# Patient Record
Sex: FEMALE | Race: WHITE | Employment: FULL TIME | URBAN - METROPOLITAN AREA
[De-identification: names, ages, dates, MRNs, and addresses within clinical notes are randomized per-mention and may not be internally consistent; named-entity substitution may affect disease eponyms.]

---

## 2018-03-29 ENCOUNTER — OFFICE VISIT (OUTPATIENT)
Dept: OBGYN CLINIC | Age: 28
End: 2018-03-29

## 2018-03-29 ENCOUNTER — TELEPHONE (OUTPATIENT)
Dept: OBGYN CLINIC | Age: 28
End: 2018-03-29

## 2018-03-29 VITALS
WEIGHT: 176 LBS | HEART RATE: 102 BPM | SYSTOLIC BLOOD PRESSURE: 132 MMHG | DIASTOLIC BLOOD PRESSURE: 70 MMHG | HEIGHT: 59 IN | BODY MASS INDEX: 35.48 KG/M2 | RESPIRATION RATE: 18 BRPM

## 2018-03-29 DIAGNOSIS — Z32.00 ENCOUNTER FOR CONFIRMATION OF PREGNANCY TEST RESULT WITH PHYSICAL EXAMINATION: Primary | ICD-10-CM

## 2018-03-29 LAB
HCG URINE, QL. (POC): NEGATIVE
VALID INTERNAL CONTROL?: YES

## 2018-03-29 NOTE — PROGRESS NOTES
Confirmation of Pregnancy Visit    Florina Ball is a 28 y.o. G2 [id number] presenting for confirmation of pregnancy. She had her first positive UPT on yesterday. She notes new spotting today. Pregnancy symptoms:  -N/V? N  -breast tenderness? N  -fatigue? N  -cramping? N  -weight change? N  -Vaginal bleeding? N  -Fetal Movement? N    Ob/Gyn Hx:         Review of Systems - History obtained from the patient  Constitutional: negative for weight loss, fever, night sweats  HEENT: negative for hearing loss, earache, congestion, snoring, sorethroat  CV: negative for chest pain, palpitations, edema  Resp: negative for cough, shortness of breath, wheezing  GI: negative for change in bowel habits, abdominal pain, black or bloody stools  : negative for frequency, dysuria, hematuria, vaginal discharge  MSK: negative for back pain, joint pain, muscle pain  Breast: negative for breast lumps, nipple discharge, galactorrhea  Skin :negative for itching, rash, hives  Neuro: negative for dizziness, headache, confusion, weakness  Psych: negative for anxiety, depression, change in mood  Heme/lymph: negative for bleeding, bruising, pallor    Physical Exam    Visit Vitals    /70 (BP 1 Location: Left arm, BP Patient Position: Sitting)    Pulse (!) 102    Resp 18    Ht 4' 11\" (1.499 m)    Wt 176 lb (79.8 kg)    BMI 35.55 kg/m2       Constitutional  · Appearance: well-nourished, well developed, alert, in no acute distress    HENT  · Head and Face: appears normal    Neck  · Inspection/Palpation: normal appearance, no masses or tenderness  · Lymph Nodes: no lymphadenopathy present  · Thyroid: gland size normal, nontender, no nodules or masses present on palpation    Chest  · Respiratory Effort: non-labored breathing  · Auscultation: CTAB, normal breath sounds    Cardiovascular  · Heart:  · Auscultation: regular rate and rhythm without murmur  · Extremities: no peripheral edema    Gastrointestinal  · Abdominal Examination: abdomen non-tender to palpation, normal bowel sounds, no masses present  · Liver and spleen: no hepatomegaly present, spleen not palpable  · Hernias: no hernias identified    Skin  · General Inspection: no rash, no lesions identified    Neurologic/Psychiatric  · Mental Status:  · Orientation: grossly oriented to person, place and time  · Mood and Affect: mood normal, affect appropriate    UPT: NEGATIVE in office    Assessment/Plan:  28 y.o.      HCG quant sent today, will contact with results    RTC:  prn for problems or concerns  Cramping, pain, bleeding precautions reviewed  Handouts and instructions provided    Hugh Thayer MD  3/29/2018  2:13 PM

## 2018-03-29 NOTE — TELEPHONE ENCOUNTER
Patient calling stating that she found out she is pregnant 2 days ago and now experiencing vaginal bleeding and mild cramping. Patient reports that the bleeding started yesterday morning and is light in flow, bright red, no clots and cramping comes and goes. Approx. LMP 2/20/218 which makes her roughly 5w2d pregnant. Patient is not drinking much water, last episode of intercourse was 3 days ago and last BM was yesterday and no problems with constipation. Patient has had a miscarriage before. Patient offered appointment, patient declined. Patient asked me to send the message to Dr. Eliz Arroyo as patient is supposed to be going out of town fore the Easter holiday. Patient advised pelvic rest and to hydrate herself with a glass of cold cold water. Please advise.

## 2018-03-30 ENCOUNTER — TELEPHONE (OUTPATIENT)
Dept: OBGYN CLINIC | Age: 28
End: 2018-03-30

## 2018-03-30 LAB
ABO GROUP BLD: NORMAL
HCG INTACT+B SERPL-ACNC: 3 MIU/ML
RH BLD: POSITIVE

## 2018-03-30 NOTE — TELEPHONE ENCOUNTER
Voice message left for patient to have a repeat lab HCG QT again in one week, call the office if any questions.

## 2018-03-30 NOTE — PROGRESS NOTES
Notify pt of result, needs to repeat hcg next week. Early preg versus chemical preg. Notified via voice message .

## 2018-06-14 ENCOUNTER — OFFICE VISIT (OUTPATIENT)
Dept: OBGYN CLINIC | Age: 28
End: 2018-06-14

## 2018-06-14 VITALS — HEIGHT: 59 IN | WEIGHT: 171.6 LBS | BODY MASS INDEX: 34.6 KG/M2

## 2018-06-14 DIAGNOSIS — Z34.81 PRENATAL CARE, SUBSEQUENT PREGNANCY, FIRST TRIMESTER: Primary | ICD-10-CM

## 2018-06-14 LAB
ANTIBODY SCREEN, EXTERNAL: NEGATIVE
CHLAMYDIA, EXTERNAL: NEGATIVE
HBSAG, EXTERNAL: NEGATIVE
HCT, EXTERNAL: 36.9
HGB EVAL, EXTERNAL: NORMAL
HGB, EXTERNAL: 12.4
HIV, EXTERNAL: NORMAL
N. GONORRHEA, EXTERNAL: NEGATIVE
PAP SMEAR, EXTERNAL: NORMAL
PLATELET CNT,   EXTERNAL: 287
RUBELLA, EXTERNAL: NORMAL
T. PALLIDUM, EXTERNAL: NEGATIVE
VARICELLA, EXTERNAL: NORMAL

## 2018-06-14 NOTE — PROGRESS NOTES
Current pregnancy history:    Ivette Gonsalves is a  33 y.o. female [facility] No LMP recorded. Patient is pregnant. .  She presents for the evaluation of amenorrhea a positive pregnancy test.    LMP history:  Reports miscarriage 2018. Did not have a cycle between miscarriage and new pregnancy. Ultrasound data:  She had an  ultrasound done by the ultrasound tech today which revealed a viable quinn pregnancy with a gestational age of 5 weeks and 1 days giving an EDC of 19. TA ULTRASOUND PERFORMED  A SINGLE VIABLE 11W1D WITH ISATU OF 2019 IUP IS SEEN WITH NORMAL CARDIAC RHYTHM. GESTATIONAL AGE BASED ON TODAYS ULTRASOUND. A NORMAL YOLK SAC IS SEEN. RIGHT OVARY APPEARS WITHIN NORMAL LIMITS. LEFT OVARY APPEARS WITHIN NORMAL LIMITS. NO FREE FLUID IS SEEN IN THE CDS. Pregnancy symptoms:    Since her LMP she has experienced  daily nausea. She has been vomiting over the last few weeks. Declines antinausea medication at this time. Associated signs and symptoms which she denies: dysuria, discharge, vaginal bleeding. Relevant past pregnancy history:   She has the following pregnancy history: Her last pregnancy was  uncomplicated. She has no history of  delivery. Relevant past medical history:(relevant to this pregnancy): noncontributory. Pap/Occupational history:  Last pap smear: 2017 per patient Results: Normal    Hx of abnormal Pap, colpo neg        Substance history: negative for alcohol, tobacco and street drugs. Positive for nothing. Exposure history: There is/are no indoor cat/s in the home. The patient was instructed to not change the cat litter. She admits close contact with children on a regular basis. She has had chicken pox or the vaccine in the past.   Patient denies issues with domestic violence.      Genetic Screening/Teratology Counseling: (Includes patient, baby's father, or anyone in either family with:)  3.  Patient's age >/= 35 at EDC?--no  2. Thalassemia ([facility], [facility], [address], or  background): MCV<80?--no.     3.  Neural tube defect (meningomyelocele, spina bifida, anencephaly)?--no.   4.  Congenital heart defect?--no.  5.  Down syndrome?--no.   6.  Armando-Sachs ([place], [place])?--no.   7.  Canavan's Disease?--no.   8.  Familial Dysautonomia?--no.   9.  Sickle cell disease or trait ()? --no   The patient has not been tested for sickle trait  10. Hemophilia or other blood disorders?--no. 11.  Muscular dystrophy?--no. 12.  Cystic fibrosis?--no. 13.  Lepanto's Chorea?--no. 14.  Mental retardation/autism (if yes was person tested for Fragile X)?--no. 15.  Other inherited genetic or chromosomal disorder?--no. 12.  Maternal metabolic disorder (DM, PKU, etc)?--no. 17.  Patient or FOB with a child with a birth defect not listed above?--no.  17a. Patient or FOB with a birth defect themselves?--no. 18.  Recurrent pregnancy loss, or stillbirth?--no. 19.  Any medications since LMP other than prenatal vitamins (include vitamins, supplements, OTC meds, drugs, alcohol)?--no. 20.  Any other genetic/environmental exposure to discuss?--no. Infection History:  1. Lives with someone with TB or TB exposed?--no.   2.  Patient or partner has history of genital herpes?--no.  3.  Rash or viral illness since LMP?--no.    4.  History of STD (GC, CT, HPV, syphilis, HIV)? --no   5. Other: OTHER? Past Medical History:   Diagnosis Date    Abnormal Papanicolaou smear of cervix     ASCUS pos hpv. colpo satisfactory    Anemia     taking iron     History of miscarriage, currently pregnant      Past Surgical History:   Procedure Laterality Date    HX OTHER SURGICAL      wisdom teeth     HX OTHER SURGICAL      cyst removed from hand     Social History     Occupational History    Not on file.      Social History Main Topics    Smoking status: Never Smoker    Smokeless tobacco: Never Used    Alcohol use No    Drug use: No    Sexual activity: Yes     Partners: Male     Birth control/ protection: None     Family History   Problem Relation Age of Onset    Breast Cancer Maternal Grandmother      OB History    Para Term  AB Living   4 1 1 0 2 1   SAB TAB Ectopic Molar Multiple Live Births   2 0 0 0 0 1      # Outcome Date GA Lbr Franco/2nd Weight Sex Delivery Anes PTL Lv   4 Current            3 Term 16 40w5d  7 lb 4.8 oz (3.31 kg) F  LO SPINAL AN N RAYMOND      Complications: Failure to Progress in First Stage   2 SAB            1 SAB               Obstetric Comments   sab 2015, 2018     No Known Allergies  Prior to Admission medications    Medication Sig Start Date End Date Taking? Authorizing Provider   HYDROcodone-acetaminophen (NORCO) 5-325 mg per tablet Take 2 Tabs by mouth every six (6) hours as needed. Max Daily Amount: 8 Tabs. 16   Alicja Razo MD   ibuprofen (MOTRIN) 600 mg tablet Take 1 Tab by mouth every six (6) hours as needed for Pain. 16   Alicja Razo MD   PNV no.[id number] acid-dha -610 mg-mcg-mg cmpk Take  by mouth.     Historical Provider        Review of Systems: History obtained from the patient  Constitutional: negative for weight loss, fever, night sweats  HEENT: negative for hearing loss, earache, congestion, snoring, sore throat  CV: negative for chest pain, palpitations, edema  Resp: negative for cough, shortness of breath, wheezing  Breast: negative for breast lumps, nipple discharge, galactorrhea  GI: negative for change in bowel habits, abdominal pain, black or bloody stools  : negative for frequency, dysuria, hematuria, vaginal discharge  MSK: negative for back pain, joint pain, muscle pain  Skin: negative for itching, rash, hives  Neuro: negative for dizziness, headache, confusion, weakness  Psych: negative for anxiety, depression, change in mood  Heme/lymph: negative for bleeding, bruising, pallor    Objective:  Visit Vitals    Ht 4' 11\" (1.499 m)    Wt 171 lb 9.6 oz (77.8 kg)    BMI 34.66 kg/m2       Physical Exam:     Constitutional  · Appearance: well-nourished, well developed, alert, in no acute distress    HENT  · Head  · Face: appears normal  · Eyes: appear normal  · Ears: normal  · Mouth: normal  · Lips: no lesions      Chest  · Respiratory Effort: breathing unlabored     Cardiovascular  · Heart:  · Auscultation: regular rate and rhythm without murmur      Gastrointestinal  · Abdominal Examination: abdomen non-tender to palpation, normal bowel sounds, no masses present  · Liver and spleen: no hepatomegaly present, spleen not palpable  · Hernias: no hernias identified    Genitourinary  · EGBUS No REP, or discharge normal hair distribuation  · Vagina pink, no discharge, no lesions  · Cervix pink, closed  Skin  · General Inspection: no rash, no lesions identified    Neurologic/Psychiatric  · Mental Status:  · Orientation: grossly oriented to person, place and time  · Mood and Affect: mood normal, affect appropriate    Assessment:   Intrauterine pregnancy with issues addressed in problem list      Plan:     Offered CF testing, CVS, Nuchal Translucency, MSAFP, amnio, and discussed NIPT  Course of pregnancy discussed including visit schedule, routine U/S, glucola testing, etc.  Avoid alcoholic beverages and illicit/recreational drugs use  Take prenatal vitamins or folic acid daily. Hospital and practice style discussed with coverage system. Discussed nutrition, toxoplasmosis precautions, sexual activity, exercise, need for influenza vaccine, environmental and work hazards, travel advice, screen for domestic violence, need for seat belts. Discussed seafood, unpasteurized dairy products, deli meat, artificial sweeteners, and caffeine. Discussed current prescription drug use. Given medication list.  Discussed the use of over the counter medications and chemicals.   Route of delivery discussed, including risks, benefits       Hx SAB x 2 rec starting Baby ASA  Declines Genetic screening  Pap today  Routines, TOLAC vs. Repeat C/S reviewed  NOB labs today as declines genetics  Handouts given to pt. Freddy Argueta.  DONAVAN Cai/KARAN

## 2018-06-14 NOTE — PATIENT INSTRUCTIONS

## 2018-06-18 LAB
ABO GROUP BLD: NORMAL
BLD GP AB SCN SERPL QL: NEGATIVE
ERYTHROCYTE [DISTWIDTH] IN BLOOD BY AUTOMATED COUNT: 14 % (ref 12.3–15.4)
HBV SURFACE AG SERPL QL IA: NEGATIVE
HCT VFR BLD AUTO: 36.9 % (ref 34–46.6)
HGB A MFR BLD: 97.6 % (ref 96.4–98.8)
HGB A2 MFR BLD COLUMN CHROM: 2.4 % (ref 1.8–3.2)
HGB BLD-MCNC: 12.4 G/DL (ref 11.1–15.9)
HGB C MFR BLD: 0 %
HGB F MFR BLD: 0 % (ref 0–2)
HGB FRACT BLD-IMP: NORMAL
HGB OTHER MFR BLD HPLC: 0 %
HGB S BLD QL SOLY: NEGATIVE
HGB S MFR BLD: 0 %
HIV 1+2 AB+HIV1 P24 AG SERPL QL IA: NON REACTIVE
MCH RBC QN AUTO: 29.5 PG (ref 26.6–33)
MCHC RBC AUTO-ENTMCNC: 33.6 G/DL (ref 31.5–35.7)
MCV RBC AUTO: 88 FL (ref 79–97)
PLATELET # BLD AUTO: 287 X10E3/UL (ref 150–379)
RBC # BLD AUTO: 4.2 X10E6/UL (ref 3.77–5.28)
RH BLD: POSITIVE
RUBV IGG SERPL IA-ACNC: 4.29 INDEX
T PALLIDUM AB SER QL IA: NEGATIVE
VZV IGG SER IA-ACNC: 1799 INDEX
WBC # BLD AUTO: 10.4 X10E3/UL (ref 3.4–10.8)

## 2018-06-19 DIAGNOSIS — Z3A.12 12 WEEKS GESTATION OF PREGNANCY: ICD-10-CM

## 2018-06-21 LAB
C TRACH RRNA CVX QL NAA+PROBE: NEGATIVE
CYTOLOGIST CVX/VAG CYTO: ABNORMAL
CYTOLOGY CVX/VAG DOC THIN PREP: ABNORMAL
DX ICD CODE: ABNORMAL
DX ICD CODE: ABNORMAL
HPV I/H RISK 4 DNA CVX QL PROBE+SIG AMP: NEGATIVE
LABCORP, 190119: ABNORMAL
Lab: ABNORMAL
N GONORRHOEA RRNA CVX QL NAA+PROBE: NEGATIVE
OTHER STN SPEC: ABNORMAL
PATH REPORT.FINAL DX SPEC: ABNORMAL
PATHOLOGIST CVX/VAG CYTO: ABNORMAL
STAT OF ADQ CVX/VAG CYTO-IMP: ABNORMAL
T VAGINALIS RRNA SPEC QL NAA+PROBE: NEGATIVE

## 2018-06-21 NOTE — PROGRESS NOTES
Pap mildly abnormal. ASCUS but Neg HPV so we just repeat in 1 year. Could be related to when her period is and/or recent IC. STD screen on pap neg.  Thanks, Juma Camacho

## 2018-07-19 ENCOUNTER — ROUTINE PRENATAL (OUTPATIENT)
Dept: OBGYN CLINIC | Age: 28
End: 2018-07-19

## 2018-07-19 VITALS
SYSTOLIC BLOOD PRESSURE: 114 MMHG | BODY MASS INDEX: 35.36 KG/M2 | WEIGHT: 175.4 LBS | DIASTOLIC BLOOD PRESSURE: 72 MMHG | HEIGHT: 59 IN

## 2018-07-19 DIAGNOSIS — Z3A.12 12 WEEKS GESTATION OF PREGNANCY: ICD-10-CM

## 2018-07-19 DIAGNOSIS — Z34.82 PRENATAL CARE, SUBSEQUENT PREGNANCY, SECOND TRIMESTER: Primary | ICD-10-CM

## 2018-07-19 LAB — URINALYSIS, EXTERNAL: POSITIVE

## 2018-07-19 NOTE — PATIENT INSTRUCTIONS

## 2018-07-19 NOTE — PROGRESS NOTES
Will send routine urine cx today  Daily nausea, more in the evenings  Vomiting has improved  Taking Tums

## 2018-07-21 LAB — BACTERIA UR CULT: ABNORMAL

## 2018-07-23 DIAGNOSIS — Z3A.12 12 WEEKS GESTATION OF PREGNANCY: ICD-10-CM

## 2018-07-23 RX ORDER — NITROFURANTOIN 25; 75 MG/1; MG/1
100 CAPSULE ORAL 2 TIMES DAILY
Qty: 14 CAP | Refills: 0 | Status: SHIPPED | OUTPATIENT
Start: 2018-07-23 | End: 2018-07-30

## 2018-07-24 DIAGNOSIS — Z3A.12 12 WEEKS GESTATION OF PREGNANCY: ICD-10-CM

## 2018-08-15 ENCOUNTER — ROUTINE PRENATAL (OUTPATIENT)
Dept: OBGYN CLINIC | Age: 28
End: 2018-08-15

## 2018-08-15 VITALS
SYSTOLIC BLOOD PRESSURE: 128 MMHG | DIASTOLIC BLOOD PRESSURE: 80 MMHG | BODY MASS INDEX: 36.08 KG/M2 | RESPIRATION RATE: 18 BRPM | HEIGHT: 59 IN | WEIGHT: 179 LBS

## 2018-08-15 DIAGNOSIS — Z34.82 PRENATAL CARE, SUBSEQUENT PREGNANCY IN SECOND TRIMESTER: Primary | ICD-10-CM

## 2018-08-15 DIAGNOSIS — O23.42: ICD-10-CM

## 2018-08-15 DIAGNOSIS — Z3A.12 12 WEEKS GESTATION OF PREGNANCY: ICD-10-CM

## 2018-08-15 NOTE — PROGRESS NOTES
Patient had an Ultrasound today and is feeling well. Urine KASH today, completed course of Abx for UTI  FETAL SURVEY  A SINGLE VIABLE IUP AT 20W0D IS SEEN. FETAL CARDIAC MOTION OBSERVED. FETAL ANATOMY WAS WELL VISUALIZED AND APPEARS WNL. NO ABNORMALITIES WERE SEEN ON TODAYS EXAM.  APPROPRIATE GROWTH MEASURED. SIZE = DATES. RENE, PLACENTA (Anterior) AND CERVIX APPEAR WITHIN NORMAL LIMITS.   GENDER: WNL Does not want to know gender

## 2018-08-16 LAB — BACTERIA UR CULT: NO GROWTH

## 2018-09-12 ENCOUNTER — ROUTINE PRENATAL (OUTPATIENT)
Dept: OBGYN CLINIC | Age: 28
End: 2018-09-12

## 2018-09-12 VITALS — BODY MASS INDEX: 37.65 KG/M2 | WEIGHT: 186.4 LBS | DIASTOLIC BLOOD PRESSURE: 76 MMHG | SYSTOLIC BLOOD PRESSURE: 116 MMHG

## 2018-09-12 DIAGNOSIS — Z3A.24 24 WEEKS GESTATION OF PREGNANCY: Primary | ICD-10-CM

## 2018-09-12 NOTE — PATIENT INSTRUCTIONS
Weeks 22 to 26 of Your Pregnancy: Care Instructions  Your Care Instructions    As you enter your 7th month of pregnancy at week 26, your baby's lungs are growing stronger and getting ready to breathe. You may notice that your baby responds to the sound of your or your partner's voice. You may also notice that your baby does less turning and twisting and more squirming or jerking. Jerking often means that your baby has the hiccups. Hiccups are perfectly normal and are only temporary. You may want to think about attending a childbirth preparation class. This is also a good time to start thinking about whether you want to have pain medicine during labor. Most pregnant women are tested for gestational diabetes between weeks 25 and 28. Gestational diabetes occurs when your blood sugar level gets too high when you're pregnant. The test is important, because you can have gestational diabetes and not know it. But the condition can cause problems for your baby. Follow-up care is a key part of your treatment and safety. Be sure to make and go to all appointments, and call your doctor if you are having problems. It's also a good idea to know your test results and keep a list of the medicines you take. How can you care for yourself at home? Ease discomfort from your baby's kicking  · Change your position. Sometimes this will cause your baby to change position too. · Take a deep breath while you raise your arm over your head. Then breathe out while you drop your arm. Do Kegel exercises to prevent urine from leaking  · You can do Kegel exercises while you stand or sit. ¨ Squeeze the same muscles you would use to stop your urine. Your belly and thighs should not move. ¨ Hold the squeeze for 3 seconds, and then relax for 3 seconds. ¨ Start with 3 seconds. Then add 1 second each week until you are able to squeeze for 10 seconds. ¨ Repeat the exercise 10 to 15 times for each session.  Do three or more sessions each day.  Ease or reduce swelling in your feet, ankles, hands, and fingers  · If your fingers are puffy, take off your rings. · Do not eat high-salt foods, such as potato chips. · Prop up your feet on a stool or couch as much as possible. Sleep with pillows under your feet. · Do not stand for long periods of time or wear tight shoes. · Wear support stockings. Where can you learn more? Go to http://jun-mars.info/. Enter G264 in the search box to learn more about \"Weeks 22 to 26 of Your Pregnancy: Care Instructions. \"  Current as of: November 21, 2017  Content Version: 11.7  © 7034-1905 [employer]. Care instructions adapted under license by [employer] (which disclaims liability or warranty for this information). If you have questions about a medical condition or this instruction, always ask your healthcare professional. [place] [address] any warranty or liability for your use of this information.

## 2018-09-14 NOTE — PROGRESS NOTES
Here for for routine prenatal visit  Doing well  Reviewed kassie discomforts and comfort measures  GDS next visit

## 2018-10-12 ENCOUNTER — ROUTINE PRENATAL (OUTPATIENT)
Dept: OBGYN CLINIC | Age: 28
End: 2018-10-12

## 2018-10-12 VITALS
WEIGHT: 188.6 LBS | BODY MASS INDEX: 38.02 KG/M2 | DIASTOLIC BLOOD PRESSURE: 76 MMHG | SYSTOLIC BLOOD PRESSURE: 114 MMHG | HEIGHT: 59 IN

## 2018-10-12 DIAGNOSIS — Z34.83 PRENATAL CARE, SUBSEQUENT PREGNANCY IN THIRD TRIMESTER: Primary | ICD-10-CM

## 2018-10-12 DIAGNOSIS — Z3A.12 12 WEEKS GESTATION OF PREGNANCY: ICD-10-CM

## 2018-10-12 DIAGNOSIS — Z23 ENCOUNTER FOR IMMUNIZATION: ICD-10-CM

## 2018-10-12 NOTE — PROGRESS NOTES
Doing well, good FM. Still come nausea and occasional vomiting, heartburn. Glucola, Tdap and flu vaccine today. Reviewed and signed  consent; desires RLTCS if not with favorable cervix by     After obtaining consent, and per orders of Dr. Nicky Sarah, injection of Tdap given by Ondina Daniels RN in L deltoid. Patient instructed to remain in clinic for 20 minutes afterwards, and to report any adverse reaction to me immediately. After obtaining consent, and per orders of Dr. Nicky Sarah, injection of flu Vaccine given by Ondina Daniels RN in R deltoid. Patient instructed to remain in clinic for 20 minutes afterwards, and to report any adverse reaction to me immediately.

## 2018-10-12 NOTE — PATIENT INSTRUCTIONS
Weeks 26 to 30 of Your Pregnancy: Care Instructions  Your Care Instructions    You are now in your last trimester of pregnancy. Your baby is growing rapidly. And you'll probably feel your baby moving around more often. Your doctor may ask you to count your baby's kicks. Your back may ache as your body gets used to your baby's size and length. If you haven't already had the Tdap shot during this pregnancy, talk to your doctor about getting it. It will help protect your  against pertussis infection. During this time, it's important to take care of yourself and pay attention to what your body needs. If you feel sexual, explore ways to be close with your partner that match your comfort and desire. Use the tips provided in this care sheet to find ways to be sexual in your own way. Follow-up care is a key part of your treatment and safety. Be sure to make and go to all appointments, and call your doctor if you are having problems. It's also a good idea to know your test results and keep a list of the medicines you take. How can you care for yourself at home? Take it easy at work  · Take frequent breaks. If possible, stop working when you are tired, and rest during your lunch hour. · Take bathroom breaks every 2 hours. · Change positions often. If you sit for long periods, stand up and walk around. · When you stand for a long time, keep one foot on a low stool with your knee bent. After standing a lot, sit with your feet up. · Avoid fumes, chemicals, and tobacco smoke. Be sexual in your own way  · Having sex during pregnancy is okay, unless your doctor tells you not to. · You may be very interested in sex, or you may have no interest at all. · Your growing belly can make it hard to find a good position during intercourse. [place] and explore. · You may get cramps in your uterus when your partner touches your breasts.   · A back rub may relieve the backache or cramps that sometimes follow orgasm. Learn about  labor  · Watch for signs of  labor. You may be going into labor if:  ¨ You have menstrual-like cramps, with or without nausea. ¨ You have about 6 or more contractions in 1 hour, even after you have had a glass of water and are resting. ¨ You have a low, dull backache that does not go away when you change your position. ¨ You have pain or pressure in your pelvis that comes and goes in a pattern. ¨ You have intestinal cramping or flu-like symptoms, with or without diarrhea. ¨ You notice an increase or change in your vaginal discharge. Discharge may be heavy, mucus-like, watery, or streaked with blood. ¨ Your water breaks. · If you think you have  labor:  ¨ Drink 2 or 3 glasses of water or juice. Not drinking enough fluids can cause contractions. ¨ Stop what you are doing, and empty your bladder. Then lie down on your left side for at least 1 hour. ¨ While lying on your side, find your breast bone. Put your fingers in the soft spot just below it. Move your fingers down toward your belly button to find the top of your uterus. Check to see if it is tight. ¨ Contractions can be weak or strong. Record your contractions for an hour. Time a contraction from the start of one contraction to the start of the next one. ¨ Single or several strong contractions without a pattern are called [place] contractions. They are practice contractions but not the start of labor. They often stop if you change what you are doing. ¨ Call your doctor if you have regular contractions. Where can you learn more? Go to http://jun-mars.info/. Enter H374 in the search box to learn more about \"Weeks 26 to 30 of Your Pregnancy: Care Instructions. \"  Current as of: 2017  Content Version: 11.8  © 3776-8430 [employer]. Care instructions adapted under license by [employer] (which disclaims liability or warranty for this information). If you have questions about a medical condition or this instruction, always ask your healthcare professional. [place] [address] any warranty or liability for your use of this information.

## 2018-10-16 LAB
BASOPHILS # BLD AUTO: 0 X10E3/UL (ref 0–0.2)
BASOPHILS NFR BLD AUTO: 0 %
BLD GP AB SCN SERPL QL: NEGATIVE
EOSINOPHIL # BLD AUTO: 0.1 X10E3/UL (ref 0–0.4)
EOSINOPHIL NFR BLD AUTO: 1 %
ERYTHROCYTE [DISTWIDTH] IN BLOOD BY AUTOMATED COUNT: 13.4 % (ref 12.3–15.4)
GLUCOSE 1H P 50 G GLC PO SERPL-MCNC: 142 MG/DL (ref 65–139)
HCT VFR BLD AUTO: 34 % (ref 34–46.6)
HGB BLD-MCNC: 11.4 G/DL (ref 11.1–15.9)
IMM GRANULOCYTES # BLD: 0.1 X10E3/UL (ref 0–0.1)
IMM GRANULOCYTES NFR BLD: 1 %
LYMPHOCYTES # BLD AUTO: 1.7 X10E3/UL (ref 0.7–3.1)
LYMPHOCYTES NFR BLD AUTO: 14 %
MCH RBC QN AUTO: 29.6 PG (ref 26.6–33)
MCHC RBC AUTO-ENTMCNC: 33.5 G/DL (ref 31.5–35.7)
MCV RBC AUTO: 88 FL (ref 79–97)
MONOCYTES # BLD AUTO: 0.5 X10E3/UL (ref 0.1–0.9)
MONOCYTES NFR BLD AUTO: 4 %
NEUTROPHILS # BLD AUTO: 9.2 X10E3/UL (ref 1.4–7)
NEUTROPHILS NFR BLD AUTO: 80 %
PLATELET # BLD AUTO: 215 X10E3/UL (ref 150–379)
RBC # BLD AUTO: 3.85 X10E6/UL (ref 3.77–5.28)
T PALLIDUM AB SER QL IA: NEGATIVE
WBC # BLD AUTO: 11.5 X10E3/UL (ref 3.4–10.8)

## 2018-10-16 NOTE — PROGRESS NOTES
Patient aware of results and MD recommendations by phone. 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 She will get to the office tomorrow and then see if she can schedule an appt. Patient reports that she cannot change her schedule of patient's this week and she is out of town for mon/tues of next week. She will try to make something work.

## 2018-10-24 ENCOUNTER — ROUTINE PRENATAL (OUTPATIENT)
Dept: OBGYN CLINIC | Age: 28
End: 2018-10-24

## 2018-10-24 VITALS — SYSTOLIC BLOOD PRESSURE: 118 MMHG | WEIGHT: 189.2 LBS | DIASTOLIC BLOOD PRESSURE: 76 MMHG | BODY MASS INDEX: 38.21 KG/M2

## 2018-10-24 DIAGNOSIS — Z3A.30 30 WEEKS GESTATION OF PREGNANCY: ICD-10-CM

## 2018-10-24 DIAGNOSIS — O99.810 GESTATIONAL HYPERGLYCEMIA: Primary | ICD-10-CM

## 2018-10-24 DIAGNOSIS — O24.419 GESTATIONAL DIABETES MELLITUS (GDM) IN THIRD TRIMESTER, GESTATIONAL DIABETES METHOD OF CONTROL UNSPECIFIED: ICD-10-CM

## 2018-10-24 LAB
GTT 120 MIN, EXTERNAL: 155
GTT 180 MIN, EXTERNAL: 86
GTT 60 MIN, EXTERNAL: 206
GTT, FASTING, EXTERNAL: 56

## 2018-10-24 NOTE — PATIENT INSTRUCTIONS

## 2018-10-25 ENCOUNTER — TELEPHONE (OUTPATIENT)
Dept: OBGYN CLINIC | Age: 28
End: 2018-10-25

## 2018-10-25 LAB
GLUCOSE 1H P 100 G GLC PO SERPL-MCNC: 206 MG/DL (ref 65–179)
GLUCOSE 2H P 100 G GLC PO SERPL-MCNC: 155 MG/DL (ref 65–154)
GLUCOSE 3H P 100 G GLC PO SERPL-MCNC: 86 MG/DL (ref 65–139)
GLUCOSE P FAST SERPL-MCNC: 56 MG/DL (ref 65–94)
NOTE:, 102047: ABNORMAL

## 2018-10-25 NOTE — PROGRESS NOTES
Looks like this gal has GDM.   Needs set up with diabetes treatment center  Please let her know   Thanks  kellen

## 2018-10-25 NOTE — PROGRESS NOTES
Patient aware of results and MD recommendations by phone. Patient states that the appt that was scheduled for her needs to be R/s. Patient given address and number to the DTC for her to call and R/S the appt.

## 2018-10-25 NOTE — PROGRESS NOTES
Scheduled patient for 10/29 @ 1 pm with [facility] @ [facility]. Address is [address] [place] [address], [address] [place] [address], [address] [place] [address]. If she needs to reschedule, the phone number is [phone].

## 2018-10-26 NOTE — PROGRESS NOTES
Received message from Spherix Kayden, patient cancelled appointment on 10/29. Will call back late Monday to reschedule.

## 2018-11-02 ENCOUNTER — HOSPITAL ENCOUNTER (OUTPATIENT)
Dept: DIABETES SERVICES | Age: 28
Discharge: HOME | End: 2018-11-02
Payer: COMMERCIAL

## 2018-11-02 ENCOUNTER — TELEPHONE (OUTPATIENT)
Dept: OBGYN CLINIC | Age: 28
End: 2018-11-02

## 2018-11-02 DIAGNOSIS — Z3A.12 12 WEEKS GESTATION OF PREGNANCY: ICD-10-CM

## 2018-11-02 PROCEDURE — G0108 DIAB MANAGE TRN  PER INDIV: HCPCS

## 2018-11-02 RX ORDER — LANCETS
EACH MISCELLANEOUS
Qty: 200 EACH | Refills: 3 | Status: SHIPPED | OUTPATIENT
Start: 2018-11-02 | End: 2019-02-08

## 2018-11-02 NOTE — DIABETES MGMT
11/02/18      Thank you for your kind referral. Your patient, Steve Bhatti attended a gestational diabetes education session at [facility] where the following topics were covered today:    *Describing diabetes disease process and treatment options   *Incorporating nutrition management into lifestyle   *Monitoring blood glucose and other parameters and interpreting and using the results for self-management decision making   *Preventing, detecting and treating acute complications   * Incorporating physical activity into lifestyle   * Using medication(s) safely and for maximum therapeutic benefit   * Develop personal strategies to promote health and behavior change  * States relationship of blood glucose control in pregnancy and outcomes for mother and baby    Data from this visit:    Weight:11/2/2018 187.4 #     Blood Glucose: 90 mg/dl (Random)    Meter given: Verio Flex    Your patient will have a follow up appointment in two weeks. Their next visit is scheduled for 11-16-18. We look forward to assisting your patient in meeting their self-management goals.  If you have any questions, please do not hesitate to call the [facility] at [phone]    Sincerely, [facility], [address] [place] [address], [address] [place] [address], [address] [facility], [address] [place] [address]  Phone: [phone] Fax: [phone]

## 2018-11-02 NOTE — TELEPHONE ENCOUNTER
Needs RX for lancets and strips for One Touch Verio Flex Monitor she was just diagnosed with gestational diabetes.     Teodoro Chery and Yarely Mae

## 2018-11-02 NOTE — TELEPHONE ENCOUNTER
Patient did ask about breast pump. She will check with her insurance as to where she needs to have breast pump ordered from. She will notify nurse at time of next visit when order is needed.

## 2018-11-08 ENCOUNTER — ROUTINE PRENATAL (OUTPATIENT)
Dept: OBGYN CLINIC | Age: 28
End: 2018-11-08

## 2018-11-08 VITALS — SYSTOLIC BLOOD PRESSURE: 124 MMHG | DIASTOLIC BLOOD PRESSURE: 82 MMHG | BODY MASS INDEX: 37.77 KG/M2 | WEIGHT: 187 LBS

## 2018-11-08 DIAGNOSIS — Z3A.12 12 WEEKS GESTATION OF PREGNANCY: ICD-10-CM

## 2018-11-08 DIAGNOSIS — Z3A.32 32 WEEKS GESTATION OF PREGNANCY: Primary | ICD-10-CM

## 2018-11-08 NOTE — PROGRESS NOTES
Excellent BS control on her normal diet; all fastings <90 and postprandiols <110; very stressed at work but feeling much better due to reassurance offered regarding BS; plans to have RLTCS but seeing me 2days prior to surgery and open to MIKI if spontaneous labor

## 2018-11-08 NOTE — PATIENT INSTRUCTIONS
High Blood Pressure in Pregnancy: Care Instructions  Your Care Instructions    High blood pressure (hypertension) means that the force of blood against your artery walls is too strong. Mild high blood pressure during pregnancy is not usually dangerous. Your doctor will probably just want to watch you closely. But when blood pressure is very high, it can reduce oxygen to your baby. This can affect how well your baby grows. High blood pressure also means that you are at higher risk for:  · Preeclampsia. This is a problem that includes high blood pressure and damage to your liver or kidneys. It can also reduce how much oxygen your baby gets. In some cases, it leads to eclampsia. Eclampsia causes seizures. · Placental abruption. This is a problem when the placenta separates from the uterus before birth. It prevents the baby from getting enough oxygen and nutrients. Sometimes it can cause death for the baby and the mother. To prevent problems for you or your baby, you will have to check your blood pressure often. You will do this until after your baby is born. If your blood pressure rises suddenly or is very high during your pregnancy, your doctor may prescribe medicines. They can usually control blood pressure. If your blood pressure affects your or your baby's health, your doctor may need to deliver your baby early. After your baby is born, your blood pressure will probably improve. But sometimes blood pressure problems continue after birth. Follow-up care is a key part of your treatment and safety. Be sure to make and go to all appointments, and call your doctor if you are having problems. It's also a good idea to know your test results and keep a list of the medicines you take. How can you care for yourself at home? · Take and write down your blood pressure at home if your doctor tells you to. · Take your medicines exactly as prescribed.  Call your doctor if you think you are having a problem with your medicine. · Do not smoke. If you need help quitting, talk to your doctor about stop-smoking programs and medicines. These can increase your chances of quitting for good. · Do not gain too much weight during your pregnancy. Talk to your doctor about how much weight gain is healthy. · Eat a healthy diet. · If your doctor says it's okay, get regular exercise. Walking or swimming several times a week can be healthy for you and your baby. · Reduce stress, and find time to relax. This is very important if you continue to work or have a busy schedule. It's also important if you have small children at home. When should you call for help? Call 911 anytime you think you may need emergency care. For example, call if:    · You passed out (lost consciousness).     · You have a seizure.    Call your doctor now or seek immediate medical care if:    · You have symptoms of preeclampsia, such as:  ? Sudden swelling of your face, hands, or feet. ? New vision problems (such as dimness or blurring). ? A severe headache.     · Your blood pressure is higher than it should be or rises suddenly.     · You have new nausea or vomiting.     · You think that you are in labor.     · You have pain in your belly or pelvis.    Watch closely for changes in your health, and be sure to contact your doctor if:    · You gain weight rapidly. Where can you learn more? Go to http://jun-mars.info/. Enter [phone] in the search box to learn more about \"High Blood Pressure in Pregnancy: Care Instructions. \"  Current as of: November 21, 2017  Content Version: 11.8  © 7848-8457 Healthwise, [employer]. Care instructions adapted under license by [employer] (which disclaims liability or warranty for this information).  If you have questions about a medical condition or this instruction, always ask your healthcare professional. [address] any warranty or liability for your use of this information.

## 2018-11-16 ENCOUNTER — TELEPHONE (OUTPATIENT)
Dept: OBGYN CLINIC | Age: 28
End: 2018-11-16

## 2018-11-16 NOTE — TELEPHONE ENCOUNTER
Call received at 334PM    33year old patient  33w2d pregnant patient  last seen in the office on 18. Patient denies vaginal bleeding, ROM, and reports positive fetal movement. Patient calling to report that for the past hour she has had constant discomfort at 4 on the pain scale in her right lower pelvis radiating some to her back. This nurse transferred the patient to speak to Dr. Dragan Pritchard. Patient verbalized understanding.

## 2018-11-19 NOTE — TELEPHONE ENCOUNTER
Spoke with Elidia at length on phone; precautions reviewed; will try hydration and position changes; soak in tub and update if pain persists/worsens

## 2018-11-23 ENCOUNTER — ROUTINE PRENATAL (OUTPATIENT)
Dept: OBGYN CLINIC | Age: 28
End: 2018-11-23

## 2018-11-23 VITALS
SYSTOLIC BLOOD PRESSURE: 120 MMHG | DIASTOLIC BLOOD PRESSURE: 82 MMHG | HEIGHT: 59 IN | WEIGHT: 189.8 LBS | BODY MASS INDEX: 38.26 KG/M2

## 2018-11-23 DIAGNOSIS — Z3A.34 34 WEEKS GESTATION OF PREGNANCY: Primary | ICD-10-CM

## 2018-11-23 NOTE — PROGRESS NOTES
BS nml per patient. Forgot she had appointment today, ran out of the house to make it on time.   Forgot log

## 2018-11-23 NOTE — PATIENT INSTRUCTIONS
Weeks 34 to 36 of Your Pregnancy: Care Instructions  Your Care Instructions    By now, your baby and your belly have grown quite large. It is almost time to give birth. A full-term pregnancy can deliver between 37 and 42 weeks. Your baby's lungs are almost ready to breathe air. The bones in your baby's head are now firm enough to protect it, but soft enough to move down through the birth canal.  You may feel excited, happy, anxious, or scared. You may wonder how you will know if you are in labor or what to expect during labor. Try to be flexible in your expectations of the birth. Because each birth is different, there is no way to know exactly what childbirth will be like for you. This care sheet will help you know what to expect and how to prepare. This may make your childbirth easier. If you haven't already had the Tdap shot during this pregnancy, talk to your doctor about getting it. It will help protect your  against pertussis infection. In the 36th week, most women have a test for group B streptococcus (GBS). GBS is a common bacteria that can live in the vagina and rectum. It can make your baby sick after birth. If you test positive, you will get antibiotics during labor. The medicine will keep your baby from getting the bacteria. Follow-up care is a key part of your treatment and safety. Be sure to make and go to all appointments, and call your doctor if you are having problems. It's also a good idea to know your test results and keep a list of the medicines you take. How can you care for yourself at home? Learn about pain relief choices  · Pain is different for every woman. Talk with your doctor about your feelings about pain. · You can choose from several types of pain relief. These include medicine or breathing techniques, as well as comfort measures. You can use more than one option. · If you choose to have pain medicine during labor, talk to your doctor about your options.  Some medicines lower anxiety and help with some of the pain. Others make your lower body numb so that you won't feel pain. · Be sure to tell your doctor about your pain medicine choice before you start labor or very early in your labor. You may be able to change your mind as labor progresses. · Rarely, a woman is put to sleep by medicine given through a mask or an IV. Labor and delivery  · The first stage of labor has three parts: early, active, and transition. ? Most women have early labor at home. You can stay busy or rest, eat light snacks, drink clear fluids, and start counting contractions. ? When talking during a contraction gets hard, you may be moving to active labor. During active labor, you should head for the hospital if you are not there already. ? You are in active labor when contractions come every 3 to 4 minutes and last about 60 seconds. Your cervix is opening more rapidly. ? If your water breaks, contractions will come faster and stronger. ? During transition, your cervix is stretching, and contractions are coming more rapidly. ? You may want to push, but your cervix might not be ready. Your doctor will tell you when to push. · The second stage starts when your cervix is completely opened and you are ready to push. ? Contractions are very strong to push the baby down the birth canal.  ? You will feel the urge to push. You may feel like you need to have a bowel movement. ? You may be coached to push with contractions. These contractions will be very strong, but you will not have them as often. You can get a little rest between contractions. ? You may be emotional and irritable. You may not be aware of what is going on around you.  ? One last push, and your baby is born. · The third stage is when a few more contractions push out the placenta. This may take 30 minutes or less. · The fourth stage is the welcome recovery. You may feel overwhelmed with emotions and exhausted but alert.  This is a good time to start breastfeeding. Where can you learn more? Go to http://jun-mars.info/. Enter O949 in the search box to learn more about \"Weeks 34 to 36 of Your Pregnancy: Care Instructions. \"  Current as of: November 21, 2017  Content Version: 11.8  © 5752-4152 Healthwise, [employer]. Care instructions adapted under license by [employer] (which disclaims liability or warranty for this information). If you have questions about a medical condition or this instruction, always ask your healthcare professional. [address] any warranty or liability for your use of this information.

## 2018-12-05 ENCOUNTER — ROUTINE PRENATAL (OUTPATIENT)
Dept: OBGYN CLINIC | Age: 28
End: 2018-12-05

## 2018-12-05 VITALS
HEIGHT: 59 IN | BODY MASS INDEX: 38.87 KG/M2 | WEIGHT: 192.8 LBS | SYSTOLIC BLOOD PRESSURE: 118 MMHG | DIASTOLIC BLOOD PRESSURE: 76 MMHG

## 2018-12-05 DIAGNOSIS — Z34.83 PRENATAL CARE, SUBSEQUENT PREGNANCY, THIRD TRIMESTER: Primary | ICD-10-CM

## 2018-12-05 LAB — GRBS, EXTERNAL: NEGATIVE

## 2018-12-05 NOTE — PATIENT INSTRUCTIONS
Weeks 34 to 36 of Your Pregnancy: Care Instructions  Your Care Instructions    By now, your baby and your belly have grown quite large. It is almost time to give birth. A full-term pregnancy can deliver between 37 and 42 weeks. Your baby's lungs are almost ready to breathe air. The bones in your baby's head are now firm enough to protect it, but soft enough to move down through the birth canal.  You may feel excited, happy, anxious, or scared. You may wonder how you will know if you are in labor or what to expect during labor. Try to be flexible in your expectations of the birth. Because each birth is different, there is no way to know exactly what childbirth will be like for you. This care sheet will help you know what to expect and how to prepare. This may make your childbirth easier. If you haven't already had the Tdap shot during this pregnancy, talk to your doctor about getting it. It will help protect your  against pertussis infection. In the 36th week, most women have a test for group B streptococcus (GBS). GBS is a common bacteria that can live in the vagina and rectum. It can make your baby sick after birth. If you test positive, you will get antibiotics during labor. The medicine will keep your baby from getting the bacteria. Follow-up care is a key part of your treatment and safety. Be sure to make and go to all appointments, and call your doctor if you are having problems. It's also a good idea to know your test results and keep a list of the medicines you take. How can you care for yourself at home? Learn about pain relief choices  · Pain is different for every woman. Talk with your doctor about your feelings about pain. · You can choose from several types of pain relief. These include medicine or breathing techniques, as well as comfort measures. You can use more than one option. · If you choose to have pain medicine during labor, talk to your doctor about your options.  Some medicines lower anxiety and help with some of the pain. Others make your lower body numb so that you won't feel pain. · Be sure to tell your doctor about your pain medicine choice before you start labor or very early in your labor. You may be able to change your mind as labor progresses. · Rarely, a woman is put to sleep by medicine given through a mask or an IV. Labor and delivery  · The first stage of labor has three parts: early, active, and transition. ? Most women have early labor at home. You can stay busy or rest, eat light snacks, drink clear fluids, and start counting contractions. ? When talking during a contraction gets hard, you may be moving to active labor. During active labor, you should head for the hospital if you are not there already. ? You are in active labor when contractions come every 3 to 4 minutes and last about 60 seconds. Your cervix is opening more rapidly. ? If your water breaks, contractions will come faster and stronger. ? During transition, your cervix is stretching, and contractions are coming more rapidly. ? You may want to push, but your cervix might not be ready. Your doctor will tell you when to push. · The second stage starts when your cervix is completely opened and you are ready to push. ? Contractions are very strong to push the baby down the birth canal.  ? You will feel the urge to push. You may feel like you need to have a bowel movement. ? You may be coached to push with contractions. These contractions will be very strong, but you will not have them as often. You can get a little rest between contractions. ? You may be emotional and irritable. You may not be aware of what is going on around you.  ? One last push, and your baby is born. · The third stage is when a few more contractions push out the placenta. This may take 30 minutes or less. · The fourth stage is the welcome recovery. You may feel overwhelmed with emotions and exhausted but alert.  This is a good time to start breastfeeding. Where can you learn more? Go to http://jun-mars.info/. Enter D878 in the search box to learn more about \"Weeks 34 to 36 of Your Pregnancy: Care Instructions. \"  Current as of: November 21, 2017  Content Version: 11.8  © 6124-8453 [employer]. Care instructions adapted under license by [employer] (which disclaims liability or warranty for this information). If you have questions about a medical condition or this instruction, always ask your healthcare professional. [address] any warranty or liability for your use of this information.

## 2018-12-05 NOTE — PROGRESS NOTES
GBS today.    Doing well, Reviewed labor precautions  Feeling some heart racing occasionally, discussed pregnancy physiology  Follow-up in one week

## 2018-12-07 LAB — GP B STREP DNA SPEC QL NAA+PROBE: NEGATIVE

## 2018-12-11 DIAGNOSIS — Z3A.12 12 WEEKS GESTATION OF PREGNANCY: ICD-10-CM

## 2018-12-12 ENCOUNTER — ROUTINE PRENATAL (OUTPATIENT)
Dept: OBGYN CLINIC | Age: 28
End: 2018-12-12

## 2018-12-12 VITALS
DIASTOLIC BLOOD PRESSURE: 78 MMHG | HEIGHT: 59 IN | BODY MASS INDEX: 39.35 KG/M2 | SYSTOLIC BLOOD PRESSURE: 120 MMHG | WEIGHT: 195.2 LBS

## 2018-12-12 DIAGNOSIS — Z3A.37 37 WEEKS GESTATION OF PREGNANCY: Primary | ICD-10-CM

## 2018-12-12 NOTE — PATIENT INSTRUCTIONS

## 2018-12-20 ENCOUNTER — ROUTINE PRENATAL (OUTPATIENT)
Dept: OBGYN CLINIC | Age: 28
End: 2018-12-20

## 2018-12-20 ENCOUNTER — ANESTHESIA EVENT (OUTPATIENT)
Dept: LABOR AND DELIVERY | Age: 28
End: 2018-12-20
Payer: COMMERCIAL

## 2018-12-20 VITALS
HEIGHT: 59 IN | BODY MASS INDEX: 39.51 KG/M2 | SYSTOLIC BLOOD PRESSURE: 128 MMHG | DIASTOLIC BLOOD PRESSURE: 88 MMHG | WEIGHT: 196 LBS

## 2018-12-20 DIAGNOSIS — Z34.83 PRENATAL CARE, SUBSEQUENT PREGNANCY IN THIRD TRIMESTER: Primary | ICD-10-CM

## 2018-12-20 RX ORDER — LANCETS 33 GAUGE
EACH MISCELLANEOUS
Refills: 3 | COMMUNITY
Start: 2018-11-02 | End: 2019-02-08

## 2018-12-20 NOTE — PROGRESS NOTES
Doing well, no pelvic pressure. RLTCS next week.    Reviewed PAT and day of procedures - GFM, FKC reviewed, denies HA, vision changes, or epigastric pain - pressure normotensive for pt - however slight increase for her, and trace protien  PAT changed to 12/26 per pt request sectoin 12/28

## 2018-12-26 ENCOUNTER — HOSPITAL ENCOUNTER (OUTPATIENT)
Dept: OTHER | Age: 28
Discharge: HOME | End: 2018-12-26
Payer: COMMERCIAL

## 2018-12-26 LAB
ABO + RH BLD: NORMAL
BLOOD GROUP ANTIBODIES SERPL: NORMAL
ERYTHROCYTE [DISTWIDTH] IN BLOOD BY AUTOMATED COUNT: 13.4 % (ref 11.5–14.5)
HCT VFR BLD AUTO: 33.1 % (ref 35–47)
HGB BLD-MCNC: 10.7 G/DL (ref 11.5–16)
MCH RBC QN AUTO: 27.9 PG (ref 26–34)
MCHC RBC AUTO-ENTMCNC: 32.3 G/DL (ref 30–36.5)
MCV RBC AUTO: 86.2 FL (ref 80–99)
NRBC # BLD: 0 K/UL (ref 0–0.01)
NRBC BLD-RTO: 0 PER 100 WBC
PLATELET # BLD AUTO: 205 K/UL (ref 150–400)
PMV BLD AUTO: 12.4 FL (ref 8.9–12.9)
RBC # BLD AUTO: 3.84 M/UL (ref 3.8–5.2)
SPECIMEN EXP DATE BLD: NORMAL
WBC # BLD AUTO: 11.6 K/UL (ref 3.6–11)

## 2018-12-26 PROCEDURE — 85027 COMPLETE CBC AUTOMATED: CPT

## 2018-12-26 PROCEDURE — 36415 COLL VENOUS BLD VENIPUNCTURE: CPT

## 2018-12-26 PROCEDURE — 86901 BLOOD TYPING SEROLOGIC RH(D): CPT

## 2018-12-26 NOTE — PROGRESS NOTES
0906 Patient here for Pre-Admission Testing (PAT) for scheduled  section. PAT packet reviewed with the patient. Labs drawn and sent. YOVANNY wipes and preventing surgical site infection education given to the patient. Education also provided to be NPO after midnight and to arrive for scheduled procedure at 0800 on 28. Patient verbalizes understanding and sent home with PAT packet for further review. Patient instructed to take no medication(s) on the morning of her scheduled procedure.

## 2018-12-27 NOTE — H&P
History & Physical    Name: Dameon Mora MRN: [id number]  SSN: [id number]    YOB: 1990  Age: 33 y.o. Sex: female        Subjective:     Estimated Date of Delivery: 19  OB History      Para Term  AB Living    4 1 1 0 2 1    SAB TAB Ectopic Molar Multiple Live Births    2 0 0 0 0 1        Obstetric Comments    sab 2015, 2018          Ms. Bob Camargo is admitted with pregnancy at 39w1d for repeat  Section. Prenatal course was normal. Please see prenatal records for details. Patient Active Problem List    Diagnosis    12 weeks gestation of pregnancy     Primary Provider: KARAN/ Jm Martini P1  Hx LTCS 3.3kg Nubiajessica Abarca; lengthy failed indxn; RLTCS scheduled  @ 8AM-- PAT  @ 9AM   consent signed   [facility] 39 by-19  SAB 3/2018  Gestational diabetes- excellent diet control  IOB labs: wnl; + UTI; KASH negative  ASCUS/HPV neg pap; Repeat x 1 yr  Genetic Screening: Declines  Anatomy:A SINGLE VIABLE IUP AT 20W0D IS SEEN. FETAL CARDIAC MOTION OBSERVED. FETAL ANATOMY WAS WELL VISUALIZED AND APPEARS WNL. NO ABNORMALITIES WERE SEEN ON TODAYS EXAM.  APPROPRIATE GROWTH MEASURED. SIZE = DATES. RENE, PLACENTA (Anterior) AND CERVIX APPEAR WITHIN NORMAL LIMITS. GENDER: WNL Does not want to know gender  GTT: 142; 3 hr gtt elevated (50,889,121,58) +GDM. Appt with DTC   Flu: 10/12  TDAP: 10/12  Rhogam: N/A  Third Tri Labs:  GBS: Negative    Pain mgmt. 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 No specialty comments available.   Past Medical History:   Diagnosis Date    Abnormal Papanicolaou smear of cervix     ASCUS pos hpv. colpo satisfactory    Gestational diabetes     History of miscarriage, currently pregnant      Past Surgical History:   Procedure Laterality Date    HX  SECTION      HX OTHER SURGICAL      wisdom teeth     HX OTHER SURGICAL      cyst removed from hand     Social History     Occupational History  Not on file   Tobacco Use    Smoking status: Never Smoker    Smokeless tobacco: Never Used   Substance and Sexual Activity    Alcohol use: No    Drug use: No    Sexual activity: Yes     Partners: Male     Birth control/protection: None     Family History   Problem Relation Age of Onset    Breast Cancer Maternal Grandmother        No Known Allergies  Prior to Admission medications    Medication Sig Start Date End Date Taking? Authorizing Provider   calcium carbonate (TUMS PO) Take  by mouth. Yes Provider, Historical   PNV no.24-iron-folic acid-dha -455 mg-mcg-mg cmpk Take  by mouth. Yes Provider, Historical   ONE TOUCH DELICA 33 gauge misc USE TO CHECK BLOOD SUGAR QID 11/2/18   Provider, Historical   glucose blood VI test strips (BLOOD GLUCOSE TEST) strip Check blood sugar levels QID daily to use with One Touch Verio Flex Monitor 25/1/20   Georga Riedel, MD   Lancets misc Check blood sugar levels QID daily to use with One Touch Verio Flex Monitor 31/4/09   Georga Riedel, MD        Review of Systems: A comprehensive review of systems was negative except for that written in the HPI. Objective:     Vitals:  Vitals:    12/26/18 0909   Weight: 196 lb (88.9 kg)   Height: 4' 11\" (1.499 m)        Physical Exam:  Patient without distress. Heart: Regular rate and rhythm  Lung: clear to auscultation throughout lung fields, no wheezes, no rales, no rhonchi and normal respiratory effort  Cervical Exam: Closed/Thick/High  Membranes:  Intact  Fetal Heart Rate: Reactive  Accelerations: yes    Prenatal Labs:   Lab Results   Component Value Date/Time    Rubella, External Immune 06/14/2018    GrBStrep, External Negative 12/05/2018    HBsAg, External Negative 06/14/2018    HIV, External Non-Reactive 06/14/2018    Gonorrhea, External Negative 06/14/2018    Chlamydia, External Negative 06/14/2018        Assessment/Plan:     Plan: Admit for Reassuring fetal status.  Proceed with elective RLTCS  Group B Strep was negative.   2g ancef preop    Signed By:  Brodie Dubois MD     December 27, 2018

## 2018-12-28 ENCOUNTER — HOSPITAL ENCOUNTER (INPATIENT)
Age: 28
LOS: 2 days | Discharge: HOME | End: 2018-12-30
Attending: OBSTETRICS & GYNECOLOGY | Admitting: OBSTETRICS & GYNECOLOGY
Payer: COMMERCIAL

## 2018-12-28 ENCOUNTER — ANESTHESIA (OUTPATIENT)
Dept: LABOR AND DELIVERY | Age: 28
End: 2018-12-28
Payer: COMMERCIAL

## 2018-12-28 PROBLEM — Z98.891 S/P REPEAT LOW TRANSVERSE C-SECTION: Status: ACTIVE | Noted: 2018-12-28

## 2018-12-28 PROCEDURE — 74011250636 HC RX REV CODE- 250/636: Performed by: ANESTHESIOLOGY

## 2018-12-28 PROCEDURE — 77030032490 HC SLV COMPR SCD KNE COVD -B

## 2018-12-28 PROCEDURE — 74011250636 HC RX REV CODE- 250/636: Performed by: OBSTETRICS & GYNECOLOGY

## 2018-12-28 PROCEDURE — 77030018836 HC SOL IRR NACL ICUM -A

## 2018-12-28 PROCEDURE — 77030018846 HC SOL IRR STRL H20 ICUM -A

## 2018-12-28 PROCEDURE — 77030034849

## 2018-12-28 PROCEDURE — 77030002966 HC SUT PDS J&J -A

## 2018-12-28 PROCEDURE — 76010000392 HC C SECN EA ADDL 0.5 HR: Performed by: OBSTETRICS & GYNECOLOGY

## 2018-12-28 PROCEDURE — 76060000078 HC EPIDURAL ANESTHESIA: Performed by: OBSTETRICS & GYNECOLOGY

## 2018-12-28 PROCEDURE — 77030012890

## 2018-12-28 PROCEDURE — 77030002933 HC SUT MCRYL J&J -A

## 2018-12-28 PROCEDURE — 65410000002 HC RM PRIVATE OB

## 2018-12-28 PROCEDURE — 76010000391 HC C SECN FIRST 1 HR: Performed by: OBSTETRICS & GYNECOLOGY

## 2018-12-28 PROCEDURE — 77030011220 HC DEV ELECSURG COVD -B

## 2018-12-28 PROCEDURE — 77030002888 HC SUT CHRMC J&J -A

## 2018-12-28 PROCEDURE — 77030007866 HC KT SPN ANES BBMI -B: Performed by: ANESTHESIOLOGY

## 2018-12-28 PROCEDURE — 74011000250 HC RX REV CODE- 250

## 2018-12-28 PROCEDURE — 74011250636 HC RX REV CODE- 250/636

## 2018-12-28 PROCEDURE — 75410000003 HC RECOV DEL/VAG/CSECN EA 0.5 HR: Performed by: OBSTETRICS & GYNECOLOGY

## 2018-12-28 RX ORDER — MORPHINE SULFATE 10 MG/ML
6 INJECTION, SOLUTION INTRAMUSCULAR; INTRAVENOUS
Status: DISCONTINUED | OUTPATIENT
Start: 2018-12-28 | End: 2018-12-28

## 2018-12-28 RX ORDER — MORPHINE SULFATE 10 MG/ML
6 INJECTION, SOLUTION INTRAMUSCULAR; INTRAVENOUS
Status: ACTIVE | OUTPATIENT
Start: 2018-12-28 | End: 2018-12-29

## 2018-12-28 RX ORDER — OXYTOCIN/RINGER'S LACTATE 20/1000 ML
PLASTIC BAG, INJECTION (ML) INTRAVENOUS
Status: COMPLETED
Start: 2018-12-28 | End: 2018-12-28

## 2018-12-28 RX ORDER — AMMONIA 15 % (W/V)
1 AMPUL (EA) INHALATION AS NEEDED
Status: DISCONTINUED | OUTPATIENT
Start: 2018-12-28 | End: 2018-12-30 | Stop reason: HOSPADM

## 2018-12-28 RX ORDER — OXYTOCIN/RINGER'S LACTATE 20/1000 ML
PLASTIC BAG, INJECTION (ML) INTRAVENOUS
Status: DISCONTINUED | OUTPATIENT
Start: 2018-12-28 | End: 2018-12-28 | Stop reason: HOSPADM

## 2018-12-28 RX ORDER — HYDROCODONE BITARTRATE AND ACETAMINOPHEN 7.5; 325 MG/1; MG/1
1 TABLET ORAL
Status: DISCONTINUED | OUTPATIENT
Start: 2018-12-28 | End: 2018-12-30 | Stop reason: HOSPADM

## 2018-12-28 RX ORDER — ONDANSETRON 2 MG/ML
4 INJECTION INTRAMUSCULAR; INTRAVENOUS
Status: DISCONTINUED | OUTPATIENT
Start: 2018-12-28 | End: 2018-12-30 | Stop reason: HOSPADM

## 2018-12-28 RX ORDER — SODIUM CHLORIDE 0.9 % (FLUSH) 0.9 %
5-10 SYRINGE (ML) INJECTION AS NEEDED
Status: DISCONTINUED | OUTPATIENT
Start: 2018-12-28 | End: 2018-12-30 | Stop reason: HOSPADM

## 2018-12-28 RX ORDER — IBUPROFEN 400 MG/1
800 TABLET ORAL EVERY 8 HOURS
Status: DISCONTINUED | OUTPATIENT
Start: 2018-12-28 | End: 2018-12-30 | Stop reason: HOSPADM

## 2018-12-28 RX ORDER — NALOXONE HYDROCHLORIDE 0.4 MG/ML
0.4 INJECTION, SOLUTION INTRAMUSCULAR; INTRAVENOUS; SUBCUTANEOUS AS NEEDED
Status: DISCONTINUED | OUTPATIENT
Start: 2018-12-28 | End: 2018-12-30 | Stop reason: HOSPADM

## 2018-12-28 RX ORDER — KETOROLAC TROMETHAMINE 30 MG/ML
30 INJECTION, SOLUTION INTRAMUSCULAR; INTRAVENOUS
Status: DISCONTINUED | OUTPATIENT
Start: 2018-12-28 | End: 2018-12-28

## 2018-12-28 RX ORDER — BUPIVACAINE HYDROCHLORIDE 7.5 MG/ML
INJECTION, SOLUTION EPIDURAL; RETROBULBAR AS NEEDED
Status: DISCONTINUED | OUTPATIENT
Start: 2018-12-28 | End: 2018-12-28

## 2018-12-28 RX ORDER — SIMETHICONE 80 MG
80 TABLET,CHEWABLE ORAL
Status: DISCONTINUED | OUTPATIENT
Start: 2018-12-28 | End: 2018-12-30 | Stop reason: HOSPADM

## 2018-12-28 RX ORDER — DOCUSATE SODIUM 100 MG/1
100 CAPSULE, LIQUID FILLED ORAL
Status: DISCONTINUED | OUTPATIENT
Start: 2018-12-28 | End: 2018-12-30 | Stop reason: HOSPADM

## 2018-12-28 RX ORDER — MORPHINE SULFATE 0.5 MG/ML
INJECTION, SOLUTION EPIDURAL; INTRATHECAL; INTRAVENOUS AS NEEDED
Status: DISCONTINUED | OUTPATIENT
Start: 2018-12-28 | End: 2018-12-28

## 2018-12-28 RX ORDER — ONDANSETRON 2 MG/ML
INJECTION INTRAMUSCULAR; INTRAVENOUS AS NEEDED
Status: DISCONTINUED | OUTPATIENT
Start: 2018-12-28 | End: 2018-12-28 | Stop reason: HOSPADM

## 2018-12-28 RX ORDER — MORPHINE SULFATE 1 MG/ML
INJECTION, SOLUTION EPIDURAL; INTRATHECAL; INTRAVENOUS
Status: COMPLETED | OUTPATIENT
Start: 2018-12-28 | End: 2018-12-28

## 2018-12-28 RX ORDER — NALBUPHINE HYDROCHLORIDE 10 MG/ML
2.5 INJECTION, SOLUTION INTRAMUSCULAR; INTRAVENOUS; SUBCUTANEOUS
Status: DISCONTINUED | OUTPATIENT
Start: 2018-12-28 | End: 2018-12-28

## 2018-12-28 RX ORDER — NALOXONE HYDROCHLORIDE 0.4 MG/ML
0.4 INJECTION, SOLUTION INTRAMUSCULAR; INTRAVENOUS; SUBCUTANEOUS AS NEEDED
Status: DISCONTINUED | OUTPATIENT
Start: 2018-12-28 | End: 2018-12-28

## 2018-12-28 RX ORDER — HYDROCODONE BITARTRATE AND ACETAMINOPHEN 5; 325 MG/1; MG/1
1 TABLET ORAL
Status: DISCONTINUED | OUTPATIENT
Start: 2018-12-28 | End: 2018-12-30 | Stop reason: HOSPADM

## 2018-12-28 RX ORDER — BUPIVACAINE HYDROCHLORIDE 7.5 MG/ML
INJECTION, SOLUTION EPIDURAL; RETROBULBAR
Status: COMPLETED | OUTPATIENT
Start: 2018-12-28 | End: 2018-12-28

## 2018-12-28 RX ORDER — KETOROLAC TROMETHAMINE 30 MG/ML
30 INJECTION, SOLUTION INTRAMUSCULAR; INTRAVENOUS
Status: DISPENSED | OUTPATIENT
Start: 2018-12-28 | End: 2018-12-29

## 2018-12-28 RX ORDER — PHENYLEPHRINE 10 MG/250 ML(40 MCG/ML)IN 0.9 % SOD.CHLORIDE INTRAVENOUS
Status: DISCONTINUED
Start: 2018-12-28 | End: 2018-12-28

## 2018-12-28 RX ORDER — ACETAMINOPHEN 325 MG/1
650 TABLET ORAL
Status: DISCONTINUED | OUTPATIENT
Start: 2018-12-28 | End: 2018-12-30 | Stop reason: HOSPADM

## 2018-12-28 RX ORDER — HYDROCORTISONE 1 %
CREAM (GRAM) TOPICAL AS NEEDED
Status: DISCONTINUED | OUTPATIENT
Start: 2018-12-28 | End: 2018-12-30 | Stop reason: HOSPADM

## 2018-12-28 RX ORDER — NALBUPHINE HYDROCHLORIDE 10 MG/ML
2.5 INJECTION, SOLUTION INTRAMUSCULAR; INTRAVENOUS; SUBCUTANEOUS
Status: ACTIVE | OUTPATIENT
Start: 2018-12-28 | End: 2018-12-29

## 2018-12-28 RX ORDER — EPHEDRINE SULFATE 50 MG/ML
INJECTION, SOLUTION INTRAVENOUS AS NEEDED
Status: DISCONTINUED | OUTPATIENT
Start: 2018-12-28 | End: 2018-12-28 | Stop reason: HOSPADM

## 2018-12-28 RX ORDER — ONDANSETRON 2 MG/ML
4 INJECTION INTRAMUSCULAR; INTRAVENOUS
Status: DISCONTINUED | OUTPATIENT
Start: 2018-12-28 | End: 2018-12-28

## 2018-12-28 RX ORDER — CEFAZOLIN SODIUM/WATER 2 G/20 ML
2 SYRINGE (ML) INTRAVENOUS ONCE
Status: COMPLETED | OUTPATIENT
Start: 2018-12-28 | End: 2018-12-28

## 2018-12-28 RX ORDER — MORPHINE SULFATE 10 MG/ML
10 INJECTION, SOLUTION INTRAMUSCULAR; INTRAVENOUS
Status: DISCONTINUED | OUTPATIENT
Start: 2018-12-28 | End: 2018-12-28

## 2018-12-28 RX ORDER — SODIUM CHLORIDE 0.9 % (FLUSH) 0.9 %
5-10 SYRINGE (ML) INJECTION EVERY 8 HOURS
Status: DISCONTINUED | OUTPATIENT
Start: 2018-12-28 | End: 2018-12-30 | Stop reason: HOSPADM

## 2018-12-28 RX ORDER — SODIUM CHLORIDE, SODIUM LACTATE, POTASSIUM CHLORIDE, CALCIUM CHLORIDE 600; 310; 30; 20 MG/100ML; MG/100ML; MG/100ML; MG/100ML
125 INJECTION, SOLUTION INTRAVENOUS CONTINUOUS
Status: DISCONTINUED | OUTPATIENT
Start: 2018-12-28 | End: 2018-12-30 | Stop reason: HOSPADM

## 2018-12-28 RX ADMIN — MORPHINE SULFATE 0.3 MG: 1 INJECTION, SOLUTION EPIDURAL; INTRATHECAL; INTRAVENOUS at 08:07

## 2018-12-28 RX ADMIN — Medication 10 ML: at 21:19

## 2018-12-28 RX ADMIN — SODIUM CHLORIDE, SODIUM LACTATE, POTASSIUM CHLORIDE, AND CALCIUM CHLORIDE 125 ML/HR: 600; 310; 30; 20 INJECTION, SOLUTION INTRAVENOUS at 09:41

## 2018-12-28 RX ADMIN — NALBUPHINE HYDROCHLORIDE 2.5 MG: 10 INJECTION, SOLUTION INTRAMUSCULAR; INTRAVENOUS; SUBCUTANEOUS at 09:10

## 2018-12-28 RX ADMIN — EPHEDRINE SULFATE 10 MG: 50 INJECTION, SOLUTION INTRAVENOUS at 08:39

## 2018-12-28 RX ADMIN — Medication: at 08:27

## 2018-12-28 RX ADMIN — SODIUM CHLORIDE, POTASSIUM CHLORIDE, SODIUM LACTATE AND CALCIUM CHLORIDE: 600; 310; 30; 20 INJECTION, SOLUTION INTRAVENOUS at 07:55

## 2018-12-28 RX ADMIN — Medication 10 ML: at 15:07

## 2018-12-28 RX ADMIN — Medication 2 G: at 07:58

## 2018-12-28 RX ADMIN — KETOROLAC TROMETHAMINE 30 MG: 30 INJECTION, SOLUTION INTRAMUSCULAR at 15:07

## 2018-12-28 RX ADMIN — BUPIVACAINE HYDROCHLORIDE 12 MG: 7.5 INJECTION, SOLUTION EPIDURAL; RETROBULBAR at 08:07

## 2018-12-28 RX ADMIN — KETOROLAC TROMETHAMINE 30 MG: 30 INJECTION, SOLUTION INTRAMUSCULAR at 21:19

## 2018-12-28 RX ADMIN — ONDANSETRON 4 MG: 2 INJECTION INTRAMUSCULAR; INTRAVENOUS at 07:59

## 2018-12-28 RX ADMIN — EPHEDRINE SULFATE 10 MG: 50 INJECTION, SOLUTION INTRAVENOUS at 08:08

## 2018-12-28 RX ADMIN — KETOROLAC TROMETHAMINE 30 MG: 30 INJECTION, SOLUTION INTRAMUSCULAR; INTRAVENOUS at 08:49

## 2018-12-28 NOTE — PROGRESS NOTES
TRANSFER - IN REPORT: 
Pt arrived per sheron holding infant Nicki Earl in her arms accompanied by L&D RN, spouse, and family. Verbal bedside report received from Almita Hudson on Ritchie Denver  being received from L&D for routine progression of care Report consisted of patients Situation, Background, Assessment and  
Recommendations(SBAR). Information from the following report(s) SBAR, Kardex, Procedure Summary, Intake/Output, MAR and Recent Results was reviewed with the receiving nurse. Opportunity for questions and clarification was provided. Assessment completed upon patients arrival to unit and care assumed.

## 2018-12-28 NOTE — ANESTHESIA POSTPROCEDURE EVALUATION
Procedure(s):  SECTION. Anesthesia Post Evaluation Patient location during evaluation: PACU Patient participation: complete - patient participated Level of consciousness: responsive to verbal stimuli Pain management: adequate Airway patency: patent Anesthetic complications: no 
Cardiovascular status: hemodynamically stable Respiratory status: acceptable Hydration status: acceptable Comments: I have seen and evaluated the patient. The patient is ready for PACU discharge. [address], DO Visit Vitals /67 Pulse (!) 107 Temp 36.4 °C (97.5 °F) Resp 12 Ht 4' 11\" (1.499 m) Wt 88.9 kg (196 lb) SpO2 97% Breastfeeding? Unknown BMI 39.59 kg/m² PAIN

## 2018-12-28 NOTE — OP NOTES
Repeat  Operative Note    Name: [facility]   Medical Record Number: [id number]   YOB: 1990  Today's Date: 2018      Pre-operative Diagnosis: REPEAT  elective; gestational diabetes well controlled on diet    Post-operative Diagnosis: same but delivered    Operation: Low Cervical Transverse Procedure(s):   SECTION    Surgeon(s):  Linda Hicks MD    Anesthesia: Spinal    Prophylactic Antibiotics: Ancef  DVT Prophylaxis: Sequential Compression Devices         Fetal Description: quinn     Birth Information:   Information for the patient's :  Roger Vaca [[id number]]   Delivery of a   male infant on 2018 at 8:26 AM. Apgars were 9  and 9 . Umbilical Cord: 3 Vessels     Umbilical Cord Events: None     Placenta: Expressed removal with Normal appearance. Amniotic Fluid Volume:        Amniotic Fluid Description:           Complications:  none    INDICATION:    33 y.o.  now at term desiring elective repeat csection    Procedure Detail:      Following verification of  proper patient identification and consent,  the patient was taken back  to the operating room, where spinal anesthesia was administered and found to be adequate. Guerin catheter had been placed using sterile technique. The patient was prepped and draped in the normal sterile fashion. The abdomen was entered through her prior Pfannenstiel scar with adequate hemostasis performed with cautery. The peritoneum was entered sharply well superior to the bladder without any apparent injury. The bladder flap was created without difficulty. A low transverse uterine incision was made with the scalpel and extended with blunt finger dissection. Amniotomy was performed and the fluid was medium amount clear. The babys head was then delivered without difficulty and atraumatically using single pull with Kiwi vacuum as infant was floating at time of delivery.  The nose and mouth were suctioned. The cord was clamped and cut and the baby was handed off to Nursing staff in attendance. Placenta was then removed from the uterus. The uterus was curettaged with a moist lap pad and cleared of all clots and debris. The uterus was noted to involute well with massage and IV pitocin. The uterine incision was closed with 0 monocryl, double layer  in running locking fashion with good hemostasis assured. The anterior pelvis was irrigated with warm normal saline and excellent  hemostasis was confirmed throughout. The peritoneum  was reapproximated with after re approximation of peritoneum. The fascia was closed with 0 PDS in a running fashion. Good hemostasis was assured. The skin was closed with a 3-0 vicryl subcuticular closure. dermabond was applied. The patient tolerated the procedure well. Sponge, lap, and needle counts were correct times three and the patient and baby were taken to recovery/postpartum room in stable condition.     Edilia Terrazas MD  December 28, 2018  9:22 AM

## 2018-12-28 NOTE — L&D DELIVERY NOTE
Delivery Summary    Patient: Rikki Martinez MRN: [id number]  SSN: [id number]    YOB: 1990  Age: 33 y.o. Sex: female        Information for the patient's :  Roger Vaca [[id number]]       Labor Events:    Labor: No   Rupture Date:     Rupture Time:     Rupture Type Intact   Amniotic Fluid Volume:      Amniotic Fluid Description:       Induction:         Augmentation:     Labor Events:       Cervical Ripening:     None     Delivery Events:  Episiotomy:     Laceration(s):       Repaired:      Number of Repair Packets:     Suture Type and Size:       Estimated Blood Loss (ml):  ml       Delivery Date: 2018    Delivery Time: 8:26 AM  Delivery Type: , Low Transverse   Details    Trial of Labor: No   Primary/Repeat: Repeat   Priority: Routine   Indications:      Incision type:     Sex:  Male     Gestational Age: 44w2d  Delivery Clinician:  Aliza Holguin  Living Status: Living   Delivery Location: L&D OR 1          APGARS  One minute Five minutes Ten minutes   Skin color: 1   1        Heart rate: 2   2        Grimace: 2   2        Muscle tone: 2   2        Breathin   2        Totals: 9   9          Presentation: Vertex    Position:        Resuscitation Method:  Suctioning-bulb; Tactile Stimulation     Meconium Stained: None      Cord Information: 3 Vessels  Complications: None  Cord around:    Delayed cord clamping?  Yes  Cord clamped date/time:2018  8:27 AM  Disposition of Cord Blood: Discard    Blood Gases Sent?: No    Placenta:  Date/Time: 2018  8:27 AM  Removal: Expressed      Appearance: Normal      Measurements:  Birth Weight: 7 lb 11.6 oz (3.505 kg)      Birth Length: 1' 8.75\" (0.527 m)      Head Circumference: 1' 2.37\" (0.365 m)      Chest Circumference:       Abdominal Girth: 1' 0.4\" (0.315 m)    Other Providers:   VIVEK JEFFERSON;SHANNAN STEEL;AUDREY LEAL;;;;;;;, Obstetrician;Primary Nurse;Primary  Nurse;Nicu Nurse;Neonatologist;Anesthesiologist;Crna;Nurse Practitioner;Midwife;Nursery Nurse             Group B Strep:   Lab Results   Component Value Date/Time    GrBStrep, External Negative 2018     Information for the patient's :  Yasmine Coates [[id number]]     Lab Results   Component Value Date/Time    ABO/Rh(D) O POSITIVE 2018 08:39 AM    BART IgG NEG 2018 08:39 AM    Bilirubin if BART pos: IF DIRECT ANNABELLE POSITIVE, BILIRUBIN TO FOLLOW 2018 08:39 AM     No results for input(s): PCO2CB, PO2CB, HCO3I, SO2I, IBD, PTEMPI, SPECTI, PHICB, ISITE, IDEV, IALLEN in the last 72 hours.

## 2018-12-28 NOTE — PROGRESS NOTES
0600: Assumed care of patient for scheduled c-ssection. 0730: Bedside, Verbal and Written shift change report given to JUANA Hidalgo RN (oncoming nurse) by BRANDON Duvall RN (offgoing nurse). Report included the following information SBAR, MAR and Recent Results.

## 2018-12-28 NOTE — ANESTHESIA PROCEDURE NOTES
Spinal Block Performed by: Mckenzie Brink DO Authorized by: Mckenzie Brink DO  
 
Pre-procedure: Indications: post-op pain management and primary anesthetic  Preanesthetic Checklist: patient identified, risks and benefits discussed, anesthesia consent, site marked, patient being monitored and timeout performed Spinal Block:  
Patient Position:  Seated Prep Region:  Lumbar Prep: Betadine Location:  L3-4 Technique:  Single shot Needle:  
Needle Type:  Pencan Needle Gauge:  24 G Attempts:  1 Events: CSF confirmed, no blood with aspiration and no paresthesia Assessment: 
Insertion:  Uncomplicated Patient tolerance:  Patient tolerated the procedure well with no immediate complications

## 2018-12-28 NOTE — PROGRESS NOTES
0735-Bedside shift change report given to JUANA Greene RN (oncoming nurse) by BRANDON Ye RN (offgoing nurse). Report included the following information SBAR, MAR and Recent Results. 1115-TRANSFER - OUT REPORT: 
 
Verbal report given to SALOME Marie RN(name) on Autumn Mayers  being transferred to MIU(unit) for routine post - op Report consisted of patients Situation, Background, Assessment and  
Recommendations(SBAR). Information from the following report(s) SBAR, Intake/Output, MAR and Recent Results was reviewed with the receiving nurse. Lines:  
Peripheral IV 12/28/18 Anterior; Inferior;Left;Lower Arm (Active) Site Assessment Clean, dry, & intact 12/28/2018  4:30 PM  
Phlebitis Assessment 0 12/28/2018  4:30 PM  
Infiltration Assessment 0 12/28/2018  4:30 PM  
Dressing Status Clean, dry, & intact 12/28/2018  4:30 PM  
Dressing Type Transparent;Tape 12/28/2018  4:30 PM  
Hub Color/Line Status Pink; Infusing 12/28/2018  4:30 PM  
Action Taken Open ports on tubing capped 12/28/2018 11:30 AM  
Alcohol Cap Used Yes 12/28/2018  4:30 PM  
  
 
Opportunity for questions and clarification was provided. Patient transported with: 
 Registered Nurse

## 2018-12-28 NOTE — ANESTHESIA PREPROCEDURE EVALUATION
Anesthetic History No history of anesthetic complications Review of Systems / Medical History Patient summary reviewed, nursing notes reviewed and pertinent labs reviewed Pulmonary Within defined limits Neuro/Psych Within defined limits Cardiovascular Within defined limits GI/Hepatic/Renal 
Within defined limits Endo/Other Within defined limits Other Findings Physical Exam 
 
Airway Mallampati: II 
TM Distance: > 6 cm Neck ROM: normal range of motion Mouth opening: Normal 
 
 Cardiovascular Regular rate and rhythm,  S1 and S2 normal,  no murmur, click, rub, or gallop Dental 
No notable dental hx Pulmonary Breath sounds clear to auscultation Abdominal 
GI exam deferred Other Findings Anesthetic Plan ASA: 2 Anesthesia type: spinal 
 
 
 
 
Induction: Intravenous Anesthetic plan and risks discussed with: Patient

## 2018-12-29 LAB
BASOPHILS # BLD: 0 K/UL (ref 0–0.1)
BASOPHILS NFR BLD: 0 % (ref 0–1)
DIFFERENTIAL METHOD BLD: ABNORMAL
EOSINOPHIL # BLD: 0.1 K/UL (ref 0–0.4)
EOSINOPHIL NFR BLD: 1 % (ref 0–7)
ERYTHROCYTE [DISTWIDTH] IN BLOOD BY AUTOMATED COUNT: 13.6 % (ref 11.5–14.5)
HCT VFR BLD AUTO: 27.1 % (ref 35–47)
HGB BLD-MCNC: 8.7 G/DL (ref 11.5–16)
IMM GRANULOCYTES # BLD: 0.1 K/UL (ref 0–0.04)
IMM GRANULOCYTES NFR BLD AUTO: 1 % (ref 0–0.5)
LYMPHOCYTES # BLD: 1.8 K/UL (ref 0.8–3.5)
LYMPHOCYTES NFR BLD: 16 % (ref 12–49)
MCH RBC QN AUTO: 27.9 PG (ref 26–34)
MCHC RBC AUTO-ENTMCNC: 32.1 G/DL (ref 30–36.5)
MCV RBC AUTO: 86.9 FL (ref 80–99)
MONOCYTES # BLD: 0.7 K/UL (ref 0–1)
MONOCYTES NFR BLD: 6 % (ref 5–13)
NEUTS SEG # BLD: 8.2 K/UL (ref 1.8–8)
NEUTS SEG NFR BLD: 76 % (ref 32–75)
NRBC # BLD: 0 K/UL (ref 0–0.01)
NRBC BLD-RTO: 0 PER 100 WBC
PLATELET # BLD AUTO: 154 K/UL (ref 150–400)
PMV BLD AUTO: 11.7 FL (ref 8.9–12.9)
RBC # BLD AUTO: 3.12 M/UL (ref 3.8–5.2)
WBC # BLD AUTO: 10.9 K/UL (ref 3.6–11)

## 2018-12-29 PROCEDURE — 74011250637 HC RX REV CODE- 250/637: Performed by: OBSTETRICS & GYNECOLOGY

## 2018-12-29 PROCEDURE — 85025 COMPLETE CBC W/AUTO DIFF WBC: CPT

## 2018-12-29 PROCEDURE — 36415 COLL VENOUS BLD VENIPUNCTURE: CPT

## 2018-12-29 PROCEDURE — 65410000002 HC RM PRIVATE OB

## 2018-12-29 RX ADMIN — IBUPROFEN 800 MG: 400 TABLET, FILM COATED ORAL at 04:09

## 2018-12-29 RX ADMIN — IBUPROFEN 800 MG: 400 TABLET, FILM COATED ORAL at 11:57

## 2018-12-29 RX ADMIN — IBUPROFEN 800 MG: 400 TABLET, FILM COATED ORAL at 20:10

## 2018-12-29 NOTE — ROUTINE PROCESS
Bedside and Verbal shift change report given to DORCAS Suarez RN (oncoming nurse) by DESHAWN Quintana RN (offgoing nurse). Report included the following information SBAR.

## 2018-12-29 NOTE — LACTATION NOTE
This note was copied from a baby's chart. Baby nursing well today,  deep latch obtained, mother is comfortable, baby feeding vigorously with rhythmic suck, swallow, breathe pattern, audible swallowing, and evident milk transfer, both breasts offered, baby is asleep following feeding. Discussed nutrition, positioning, cluster feeding.  behaviors reviewed, Very sleepy in first 24 hours, mother must wake baby for feedings, offer hand expressed drops, baby usually will respond and feed vigorously 6-8 times in the first day, but is important to offer 8-12 times,  After baby wakes from deep sleep usually on the 2nd or 3rd day a new behavior pattern follows. Frequent feeding during this brief behavioral phase preceeding milk transition is called cluster feeding. Typical  behavior: baby becomes vigorous at the breast and wants to feed frequently- every 1-2 hours for several feedings. This is the normal process by which the baby demands his/her supply. This type of frequent feeding is the stimulation which causes lactogenesis II (milk coming in).

## 2018-12-29 NOTE — PROGRESS NOTES
Post-Operative  Day 1 Nino Delgado Assessment: Post-Op day 1, stable, doing well Plan:   1. Routine post-operative care 2. Declines circ Information for the patient's :  Linden Camacho [[id number]] , Low Transverse Patient doing well without significant complaint. Nausea and vomiting resolved, tolerating liquids, no flatus, jeffries in place. Vitals: 
Visit Vitals /58 (BP 1 Location: Left arm) Pulse (!) 103 Temp 98.4 °F (36.9 °C) Resp 16 Ht 4' 11\" (1.499 m) Wt 196 lb (88.9 kg) SpO2 95% Breastfeeding? Unknown BMI 39.59 kg/m² Temp (24hrs), Av.9 °F (36.6 °C), Min:97.4 °F (36.3 °C), Max:98.7 °F (37.1 °C) Last 24hr Input/Output: 
 
Intake/Output Summary (Last 24 hours) at 2018 3490 Last data filed at 2018 9776 Gross per 24 hour Intake 1877.08 ml Output 1450 ml Net 427.08 ml Exam:   
    Patient without distress. Lungs clear. Abdomen, bowel sounds present, soft, expected tenderness, fundus firm Wound dressing intact Perineum normal lochia noted Lower extremities are negative for swelling, cords or tenderness. Labs:  
Lab Results Component Value Date/Time  WBC 10.9 2018 05:19 AM  
 WBC 11.6 (H) 2018 09:17 AM  
 WBC 11.5 (H) 10/12/2018 08:43 AM  
 WBC 10.4 2018 03:19 PM  
 WBC 14.1 (H) 2016 04:15 AM  
 WBC 11.6 (H) 2016 05:00 PM  
 HGB 8.7 (L) 2018 05:19 AM  
 HGB 10.7 (L) 2018 09:17 AM  
 HGB 11.4 10/12/2018 08:43 AM  
 HGB 12.4 2018 03:19 PM  
 HGB 10.1 (L) 2016 04:15 AM  
 HGB 12.3 2016 05:00 PM  
 HCT 27.1 (L) 2018 05:19 AM  
 HCT 33.1 (L) 2018 09:17 AM  
 HCT 34.0 10/12/2018 08:43 AM  
 HCT 36.9 2018 03:19 PM  
 HCT 30.7 (L) 2016 04:15 AM  
 HCT 36.1 2016 05:00 PM  
 PLATELET 589  05:19 AM  
 PLATELET 678  09:17 AM  
 PLATELET 596  08:43 AM  
 PLATELET 138 28/26/8960 03:19 PM  
 PLATELET 826 (L) 43/78/3979 04:15 AM  
 PLATELET 524 13/33/5223 05:00 PM  
 Hgb, External 12.4 06/14/2018 Hct, External 36.9 06/14/2018 Platelet cnt., External 287 06/14/2018 Recent Results (from the past 24 hour(s)) CBC WITH AUTOMATED DIFF Collection Time: 12/29/18  5:19 AM  
Result Value Ref Range WBC 10.9 3.6 - 11.0 K/uL  
 RBC 3.12 (L) 3.80 - 5.20 M/uL HGB 8.7 (L) 11.5 - 16.0 g/dL HCT 27.1 (L) 35.0 - 47.0 % MCV 86.9 80.0 - 99.0 FL  
 MCH 27.9 26.0 - 34.0 PG  
 MCHC 32.1 30.0 - 36.5 g/dL  
 RDW 13.6 11.5 - 14.5 % PLATELET 689 867 - 535 K/uL MPV 11.7 8.9 - 12.9 FL  
 NRBC 0.0 0  WBC ABSOLUTE NRBC 0.00 0.00 - 0.01 K/uL NEUTROPHILS 76 (H) 32 - 75 % LYMPHOCYTES 16 12 - 49 % MONOCYTES 6 5 - 13 % EOSINOPHILS 1 0 - 7 % BASOPHILS 0 0 - 1 % IMMATURE GRANULOCYTES 1 (H) 0.0 - 0.5 % ABS. NEUTROPHILS 8.2 (H) 1.8 - 8.0 K/UL  
 ABS. LYMPHOCYTES 1.8 0.8 - 3.5 K/UL  
 ABS. MONOCYTES 0.7 0.0 - 1.0 K/UL  
 ABS. EOSINOPHILS 0.1 0.0 - 0.4 K/UL  
 ABS. BASOPHILS 0.0 0.0 - 0.1 K/UL  
 ABS. IMM. GRANS. 0.1 (H) 0.00 - 0.04 K/UL  
 DF AUTOMATED

## 2018-12-29 NOTE — ROUTINE PROCESS
Bedside and Verbal shift change report given to WALTER Benavides RN (oncoming nurse) by DORCAS Hartman RN (offgoing nurse). Report included the following information SBAR, Kardex, Procedure Summary, Intake/Output, MAR, Recent Results and Med Rec Status. 5801: Aquacel dressing has slid off wound. Called Delores RUSSO and asked to remove dressing. Removed dressing as ordered.

## 2018-12-30 VITALS
BODY MASS INDEX: 39.51 KG/M2 | RESPIRATION RATE: 16 BRPM | SYSTOLIC BLOOD PRESSURE: 122 MMHG | TEMPERATURE: 97.3 F | HEART RATE: 102 BPM | WEIGHT: 196 LBS | HEIGHT: 59 IN | OXYGEN SATURATION: 95 % | DIASTOLIC BLOOD PRESSURE: 82 MMHG

## 2018-12-30 PROCEDURE — 74011250637 HC RX REV CODE- 250/637: Performed by: OBSTETRICS & GYNECOLOGY

## 2018-12-30 RX ORDER — DOCUSATE SODIUM 100 MG/1
100 CAPSULE, LIQUID FILLED ORAL
Qty: 60 CAP | Refills: 2 | Status: SHIPPED | OUTPATIENT
Start: 2018-12-30 | End: 2019-02-08

## 2018-12-30 RX ORDER — IBUPROFEN 800 MG/1
800 TABLET ORAL
Qty: 90 TAB | Refills: 3 | Status: SHIPPED | OUTPATIENT
Start: 2018-12-30 | End: 2019-02-08

## 2018-12-30 RX ORDER — HYDROCODONE BITARTRATE AND ACETAMINOPHEN 5; 325 MG/1; MG/1
1 TABLET ORAL
Qty: 20 TAB | Refills: 0 | Status: SHIPPED | OUTPATIENT
Start: 2018-12-30 | End: 2019-02-08

## 2018-12-30 RX ADMIN — IBUPROFEN 800 MG: 400 TABLET, FILM COATED ORAL at 03:58

## 2018-12-30 NOTE — LACTATION NOTE
This note was copied from a baby's chart. Baby nursing well and has improved throughout post partum stay, deep latch maintained, mother is comfortable, milk is in transition, baby feeding vigorously with rhythmic suck, swallow, breathe pattern, with audible swallowing, and evident milk transfer, both breasts offerd, baby is asleep following feeding. Baby is feeding on demand, voiding and stools present as appropriate over the last 24 hours. Infant cluster fed last pm. Mom feels like milk is coming in and infant is nursing well. Lactation education completed with family. They verbalize comprehension and deny questions. Referred to information in handbook and ped if questions arise.

## 2018-12-30 NOTE — DISCHARGE INSTRUCTIONS
POSTPARTUM DISCHARGE INSTRUCTIONS       Name:  Nitza Ferrer  Attending Physician:  Shobha Stewart MD    Delivery Type:   Section: What to Expect at Home    Your Recovery:  A  section, or , is surgery to deliver your baby through a cut, called an incision that the doctor makes in your lower belly and uterus. You may have some pain in your lower belly and need pain medicine for 1 to 2 weeks. You can expect some vaginal bleeding for several weeks. You will probably need about 6 weeks to fully recover. It is important to take it easy while the incision is healing. Avoid heavy lifting, strenuous activities, or exercises that strain the belly muscles while you are recovering. Ask a family member or friend for help with housework, cooking, and shopping. This care sheet gives you a general idea about how long it will take for you to recover. But each person recovers at a different pace. Follow the steps below to get better as quickly as possible. How can you care for yourself at home? Activity  · Rest when you feel tired. Getting enough sleep will help you recover. · Try to walk each day. Start by walking a little more than you did the day before. Bit by bit, increase the amount you walk. Walking boosts blood flow and helps prevent pneumonia, constipation, and blood clots. · Avoid strenuous activities, such as bicycle riding, jogging, weightlifting, and aerobic exercise, for 6 weeks or until your doctor says it is okay. · Until your doctor says it is okay, do not lift anything heavier than your baby. · Do not do sit-ups or other exercise that strain the belly muscles for 6 weeks or until your doctor says it is okay. · Hold a pillow over your incision when you cough or take deep breaths. This will support your belly and decrease your pain. · You may shower as usual. Pat the incision dry when you are done. · You will have some vaginal bleeding. Wear sanitary pads.  Do not douche or use tampons until your doctor says it is okay. · Ask your doctor when you can drive again. · You will probably need to take at least 6 weeks off work. It depends on the type of work you do and how you feel. · Wait until you are healed (about 4 to 6 weeks) before you have sexual intercourse. Your doctor will tell you when it is okay to have sex. · Talk to your doctor about birth control. You can get pregnant even before your period returns. Also, you can get pregnant while you are breast-feeding. Incision care  Your skin is your body's first line of defense against germs, but an incision site leaves an easy way for germs to enter your body. To prevent infection:  · Clean your hands frequently and before and after changing any touching any dressings. · Look at your incision closely every day for any changes. Contact your doctor if you experience any signs of infection, such as fever or increased redness at the surgical site. · Wash the area daily with warm, soapy water, and pat it dry. Don't use hydrogen peroxide or alcohol, which can slow healing. You may cover the area with a gauze bandage if it weeps or rubs against clothing. Change the bandage every day. · Keep the area clean and dry. Diet  · You can eat your normal diet. If your stomach is upset, try bland, low-fat foods like plain rice, broiled chicken, toast, and yogurt. · Drink plenty of fluids (unless your doctor tells you not to). · You may notice that your bowel movements are not regular right after your surgery. This is common. Try to avoid constipation and straining with bowel movements. You may want to take a fiber supplement every day. If you have not had a bowel movement after a couple of days, ask your doctor about taking a mild laxative. · If you are breast-feeding, do not drink any alcohol. Medicines  · Take pain medicines exactly as directed.   · If the doctor gave you a prescription medicine for pain, take it as prescribed. · If you are not taking a prescription pain medicine, ask your doctor if you can take an over-the-counter medicine such as acetaminophen (Tylenol), ibuprofen (Advil, Motrin), or naproxen (Aleve), for cramps. Read and follow all instructions on the label. Do not take aspirin, because it can cause more bleeding. Do not take acetaminophen (Tylenol) and other acetaminophen containing medications (i.e. Percocet) at the same time. · If you think your pain medicine is making you sick to your stomach:  · Take your medicine after meals (unless your doctor has told you not to). · Ask your doctor for a different pain medicine. · If your doctor prescribed antibiotics, take them as directed. Do not stop taking them just because you feel better. You need to take the full course of antibiotics. Mental Health  · Many women get the \"baby blues\" during the first few days after childbirth. You may lose sleep, feel irritable, and cry easily. You may feel happy one minute and sad the next. Hormone changes are one cause of these emotional changes. Also, the demands of a new baby, along with visits from relatives or other family needs, add to a mother's stress. The \"baby blues\" often peak around the fourth day. Then they ease up in less than 2 weeks. · If your moodiness or anxiety lasts for more than 2 weeks, or if you feel like life is not worth living, you may have postpartum depression. This is different for each mother. Some mothers with serious depression may worry intensely about their infant's well-being. Others may feel distant from their child. Some mothers might even feel that they might harm their baby. A mother may have signs of paranoia, wondering if someone is watching her. · With all the changes in your life, you may not know if you are depressed. Pregnancy sometimes causes changes in how you feel that are similar to the symptoms of depression.   · Symptoms of depression include:  · Feeling sad or hopeless and losing interest in daily activities. These are the most common symptoms of depression. · Sleeping too much or not enough. · Feeling tired. You may feel as if you have no energy. · Eating too much or too little. · [employer]UM [employer] ([employer]I) offers a Warm line; Chat with the Expert phone sessions; Information and Articles about Pregnancy and Postpartum Mood Disorders; Comprehensive List of Free Support Groups; Knowledgeable local coordinators who will offer support, information, and resources; Guide to Resources on [employer]; Calendar of events in the  mood disorders community; Latest News and Research; and [facility] & [facility] [address] for [employer]. Remember - You are not alone; You are not to blame; With help, you will be well. 3-677-630-PPD(4395). WWW. POSTPARTUM. NET   · Writing or talking about death, such as writing suicide notes or talking about guns, knives, or pills. Keep the numbers for these national suicide hotlines: 1-171-662-TALK (6-712.977.9044) and 3-025-QMKIPEY (5-618.939.1526). If you or someone you know talks about suicide or feeling hopeless, get help right away. Other instructions  · If you breast-feed your baby, you may be more comfortable while you are healing if you place the baby so that he or she is not resting on your belly. Try tucking your baby under your arm, with his or her body along the side you will be feeding on. Support your baby's upper body with your arm. With that hand you can control your baby's head to bring his or her mouth to your breast. This is sometimes called the football hold. Follow-up care is a key part of your treatment and safety. Be sure to make and go to all appointments, and call your doctor if you are having problems. It's also a good idea to know your test results and keep a list of the medicines you take. When should you call for help? Call 911 anytime you think you may need emergency care.  For example, call if:  · You are thinking of hurting yourself, your baby, or anyone else. · You passed out (lost consciousness). · You have symptoms of a blood clot in your lung (called a pulmonary embolism). These may include:  · Sudden chest pain. · Trouble breathing. · Coughing up blood. Call your doctor now or seek immediate medical care if:    · You have severe vaginal bleeding. · You are soaking through a pad each hour for 2 or more hours. · Your vaginal bleeding seems to be getting heavier or is still bright red 4 days after delivery. · You are dizzy or lightheaded, or you feel like you may faint. · You are vomiting or cannot keep fluids down. · You have a fever. · You have new or more belly pain. · You have loose stitches, or your incision comes open. · You have symptoms of infection, such as:  · Increased pain, swelling, warmth, or redness. · Red streaks leading from the incision. · Pus draining from the incision. · A fever  · You pass tissue (not just blood). · Your vaginal discharge smells bad. · Your belly feels tender or full and hard. · Your breasts are continuously painful or red. · You feel sad, anxious, or hopeless for more than a few days. · You have sudden, severe pain in your belly. · You have symptoms of a blood clot in your leg (called a deep vein thrombosis), such as:  · Pain in your calf, back of the knee, thigh, or groin. · Redness and swelling in your leg or groin. · You have symptoms of preeclampsia, such as:  · Sudden swelling of your face, hands, or feet. · New vision problems (such as dimness or blurring). · A severe headache. · Your blood pressure is higher than it should be or rises suddenly. · You have new nausea or vomiting. Watch closely for changes in your health, and be sure to contact your doctor if you have any problems.        Additional Information:  Gestational Diabetes After Childbirth     Most of the time, gestational diabetes goes away after a baby is born. But if you have had gestational diabetes, you have a greater chance of having it in a future pregnancy and of developing type 2 diabetes. To check for diabetes, you may have a follow-up glucose tolerance test 6 to 12 weeks after your baby is born or after you stop breast-feeding your baby. You may be able to control your blood sugar with a healthy diet and regular exercise. Staying at a healthy weight also may keep you from getting type 2 diabetes later on. If diet and exercise do not lower your blood sugar enough, you may need to take insulin shots. Insulin is safe during pregnancy. These are general instructions for a healthy lifestyle:    No smoking/ No tobacco products/ Avoid exposure to second hand smoke    Surgeon General's Warning:  Quitting smoking now greatly reduces serious risk to your health. Obesity, smoking, and sedentary lifestyle greatly increases your risk for illness    A healthy diet, regular physical exercise & weight monitoring are important for maintaining a healthy lifestyle    Recognize signs and symptoms of STROKE:    F-face looks uneven    A-arms unable to move or move unevenly    S-speech slurred or non-existent    T-time-call 911 as soon as signs and symptoms begin - DO NOT go       back to bed or wait to see if you get better - TIME IS BRAIN. I have had the opportunity to make my options or choices for discharge. I have received and understand these instructions.

## 2018-12-30 NOTE — ROUTINE PROCESS
Bedside shift change report given to DORCAS Rodas, RN (oncoming nurse) by WALTER Amaya RN (offgoing nurse).  Report included the following information SBAR

## 2018-12-30 NOTE — PROGRESS NOTES
Post-Operative  Day 2 Zo Jiang Assessment: Post-Op day 2, doing well Plan: 1. Discharge home today 2. Follow up in office in 6 weeks with Jalyn Jim CNM 3. Post partum activity/wound care advised, diet as tolerated 4. Discharge Medications: alternate tylenol and ibuprofen, take percocet as needed- if taking tylenol be midful of total of tylenol taken abisai 24 hour peroid, no more than 4000 mg per day. Resume medications prior to admission Information for the patient's :  Yanely Underwood [[id number]] , Low Transverse Patient doing well without significant complaint. Tolerating diet, passing flatus, voiding and ambulating without difficulty Vitals: 
Visit Vitals /85 (BP 1 Location: Left arm, BP Patient Position: At rest;Sitting) Pulse (!) 102 Temp 97.7 °F (36.5 °C) Resp 18 Ht 4' 11\" (1.499 m) Wt 196 lb (88.9 kg) SpO2 95% Breastfeeding? Unknown BMI 39.59 kg/m² Temp (24hrs), Av.1 °F (36.7 °C), Min:97.7 °F (36.5 °C), Max:98.5 °F (36.9 °C) Exam:   
    Patient without distress. Abdomen, bowel sounds present, soft, expected tenderness, fundus firm Wound incision clean, dry and intact Lower extremities are negative for swelling, cords or tenderness. Labs:  
Lab Results Component Value Date/Time  WBC 10.9 2018 05:19 AM  
 WBC 11.6 (H) 2018 09:17 AM  
 WBC 11.5 (H) 10/12/2018 08:43 AM  
 WBC 10.4 2018 03:19 PM  
 WBC 14.1 (H) 2016 04:15 AM  
 WBC 11.6 (H) 2016 05:00 PM  
 HGB 8.7 (L) 2018 05:19 AM  
 HGB 10.7 (L) 2018 09:17 AM  
 HGB 11.4 10/12/2018 08:43 AM  
 HGB 12.4 2018 03:19 PM  
 HGB 10.1 (L) 2016 04:15 AM  
 HGB 12.3 2016 05:00 PM  
 HCT 27.1 (L) 2018 05:19 AM  
 HCT 33.1 (L) 2018 09:17 AM  
 HCT 34.0 10/12/2018 08:43 AM  
 HCT 36.9 2018 03:19 PM  
 HCT 30.7 (L) 07/09/2016 04:15 AM  
 HCT 36.1 07/06/2016 05:00 PM  
 PLATELET 080 11/82/2833 05:19 AM  
 PLATELET 028 87/11/4506 09:17 AM  
 PLATELET 357 57/12/5439 08:43 AM  
 PLATELET 872 53/55/7299 03:19 PM  
 PLATELET 001 (L) 86/26/9979 04:15 AM  
 PLATELET 324 50/65/3956 05:00 PM  
 Hgb, External 12.4 06/14/2018 Hct, External 36.9 06/14/2018 Platelet cnt., External 287 06/14/2018 No results found for this or any previous visit (from the past 24 hour(s)).

## 2018-12-30 NOTE — DISCHARGE SUMMARY
Obstetrical Discharge Summary     Name: Zee Jacques MRN: [id number]  SSN: [id number]    YOB: 1990  Age: 33 y.o. Sex: female      Allergies: Patient has no known allergies. Admit Date: 2018    Discharge Date: 2018     Admitting Physician: Janice Rae MD     Attending Physician:  Pratibha Daly MD     * Admission Diagnoses: REPEAT   S/P repeat low transverse     * Discharge Diagnoses:   Information for the patient's :  Bula  [[id number]]   Delivery of a 7 lb 11.6 oz (3.505 kg) male infant via , Low Transverse on 2018 at 8:26 AM  by . Apgars were 9 and 9.        Additional Diagnoses:   Hospital Problems as of 2018 Date Reviewed: 2018          Codes Class Noted - Resolved POA    S/P repeat low transverse  ICD-10-CM: Z98.891  ICD-9-CM: V45.89  2018 - Present Unknown             Lab Results   Component Value Date/Time    ABO/Rh(D) O POSITIVE 2018 09:17 AM    Rubella, External Immune 2018    GrBStrep, External Negative 2018    ABO,Rh O positive  2015      Immunization History   Administered Date(s) Administered    Influenza Vaccine (Quad) PF 10/12/2018    Tdap 10/12/2018       * Procedures:   Procedure(s) with comments:   SECTION - EDC 19      [place]  Depression Scale  I have been able to laugh and see the funny side of things: As much as I always could  I have looked forward with enjoyment to things: As much as I ever did  I have blamed myself unnecessarily when things went wrong: No, never  I have been anxious or worried for no good reason: Hardly ever  I have felt scared or panicky for no very good reason: No, not at all  Things have been getting on top of me: No, most of the time I have coped quite well  I have been so unhappy that I have had difficulty sleeping: No, not at all  I have felt sad or miserable: No, not at all  I have been so unhappy that I have been crying: No, never  The thought of harming myself has occurred to me: Never  Total Score: 2    * Discharge Condition: good    [facility] Course: Normal hospital course following the delivery. * Disposition: Home    Discharge Medications:   Current Discharge Medication List          * Follow-up Care/Patient Instructions:   Activity: Activity as tolerated  Diet: Regular Diet  Wound Care: Keep wound clean and dry    Follow-up Information    None          Signed By:  Jewel Alvarez CNM     December 30, 2018

## 2018-12-30 NOTE — ROUTINE PROCESS
Bedside and Verbal shift change report given to WALTER Torres RN (oncoming nurse) by DORCAS Mendieta RN (offgoing nurse). Report included the following information SBAR, Kardex, Procedure Summary, Intake/Output, MAR, Recent Results and Med Rec Status.

## 2019-02-08 ENCOUNTER — OFFICE VISIT (OUTPATIENT)
Dept: OBGYN CLINIC | Age: 29
End: 2019-02-08

## 2019-02-08 VITALS — DIASTOLIC BLOOD PRESSURE: 80 MMHG | SYSTOLIC BLOOD PRESSURE: 124 MMHG | BODY MASS INDEX: 37.37 KG/M2 | WEIGHT: 185 LBS

## 2019-02-08 PROBLEM — E66.01 SEVERE OBESITY (HCC): Status: ACTIVE | Noted: 2019-02-08

## 2019-02-08 NOTE — PROGRESS NOTES
Postpartum evaluation    Zahida Flores is a 33 y.o. female who presents for a postpartum exam.     She is now 7 weeks post repeat  section. Her baby is doing well. She has had no menses since delivery. She has had the following significant problems since her delivery: none    The patient is breast feeding without difficulty. The patient would like to use condoms for birth control. She is currently taking: no medications. She is due for her next AE in 4 months. Visit Vitals  Wt 185 lb (83.9 kg)   Breastfeeding? Yes   BMI 37.37 kg/m²       PHYSICAL EXAMINATION    Constitutional  · Appearance: well-nourished, well developed, alert, in no acute distress    HENT  · Head and Face: appears normal    Gastrointestinal  · Abdominal Examination: abdomen non-tender to palpation, normal bowel sounds, no masses present  · Liver and spleen: no hepatomegaly present, spleen not palpable  · Hernias: no hernias identified    Genitourinary  · deferred    Skin  · General Inspection: no rash, no lesions identified    Neurologic/Psychiatric  · Mental Status:  · Orientation: grossly oriented to person, place and time  · Mood and Affect: mood normal, affect appropriate    Assessment:  Normal postpartum check    Plan:  RTO for AE.   Reviewed contraceptive options

## 2021-07-12 ENCOUNTER — HOSPITAL ENCOUNTER (EMERGENCY)
Age: 31
Discharge: HOME | End: 2021-07-12
Attending: EMERGENCY MEDICINE

## 2021-07-12 ENCOUNTER — APPOINTMENT (OUTPATIENT)
Dept: ULTRASOUND IMAGING | Age: 31
End: 2021-07-12
Attending: NURSE PRACTITIONER

## 2021-07-12 VITALS
OXYGEN SATURATION: 99 % | SYSTOLIC BLOOD PRESSURE: 129 MMHG | DIASTOLIC BLOOD PRESSURE: 86 MMHG | RESPIRATION RATE: 16 BRPM | TEMPERATURE: 97.7 F | HEART RATE: 90 BPM

## 2021-07-12 DIAGNOSIS — R10.13 ABDOMINAL PAIN, EPIGASTRIC: Primary | ICD-10-CM

## 2021-07-12 LAB
ALBUMIN SERPL-MCNC: 4.3 G/DL (ref 3.5–5)
ALBUMIN/GLOB SERPL: 1.3 {RATIO} (ref 1.1–2.2)
ALP SERPL-CCNC: 49 U/L (ref 45–117)
ALT SERPL-CCNC: 20 U/L (ref 12–78)
ANION GAP SERPL CALC-SCNC: 5 MMOL/L (ref 5–15)
APPEARANCE UR: CLEAR
AST SERPL-CCNC: 13 U/L (ref 15–37)
BACTERIA URNS QL MICRO: ABNORMAL /HPF
BASOPHILS # BLD: 0 K/UL (ref 0–0.1)
BASOPHILS NFR BLD: 0 % (ref 0–1)
BILIRUB SERPL-MCNC: 0.6 MG/DL (ref 0.2–1)
BILIRUB UR QL: NEGATIVE
BUN SERPL-MCNC: 6 MG/DL (ref 6–20)
BUN/CREAT SERPL: 8 (ref 12–20)
CALCIUM SERPL-MCNC: 9 MG/DL (ref 8.5–10.1)
CHLORIDE SERPL-SCNC: 102 MMOL/L (ref 97–108)
CO2 SERPL-SCNC: 29 MMOL/L (ref 21–32)
COLOR UR: ABNORMAL
COMMENT, HOLDF: NORMAL
CREAT SERPL-MCNC: 0.72 MG/DL (ref 0.55–1.02)
DIFFERENTIAL METHOD BLD: ABNORMAL
EOSINOPHIL # BLD: 0.1 K/UL (ref 0–0.4)
EOSINOPHIL NFR BLD: 1 % (ref 0–7)
EPITH CASTS URNS QL MICRO: ABNORMAL /LPF
ERYTHROCYTE [DISTWIDTH] IN BLOOD BY AUTOMATED COUNT: 12.5 % (ref 11.5–14.5)
GLOBULIN SER CALC-MCNC: 3.4 G/DL (ref 2–4)
GLUCOSE SERPL-MCNC: 110 MG/DL (ref 65–100)
GLUCOSE UR STRIP.AUTO-MCNC: NEGATIVE MG/DL
HCT VFR BLD AUTO: 38.3 % (ref 35–47)
HGB BLD-MCNC: 12.9 G/DL (ref 11.5–16)
HGB UR QL STRIP: ABNORMAL
IMM GRANULOCYTES # BLD AUTO: 0 K/UL (ref 0–0.04)
IMM GRANULOCYTES NFR BLD AUTO: 0 % (ref 0–0.5)
KETONES UR QL STRIP.AUTO: NEGATIVE MG/DL
LEUKOCYTE ESTERASE UR QL STRIP.AUTO: NEGATIVE
LIPASE SERPL-CCNC: 86 U/L (ref 73–393)
LYMPHOCYTES # BLD: 2.1 K/UL (ref 0.8–3.5)
LYMPHOCYTES NFR BLD: 20 % (ref 12–49)
MCH RBC QN AUTO: 29.5 PG (ref 26–34)
MCHC RBC AUTO-ENTMCNC: 33.7 G/DL (ref 30–36.5)
MCV RBC AUTO: 87.4 FL (ref 80–99)
MONOCYTES # BLD: 0.4 K/UL (ref 0–1)
MONOCYTES NFR BLD: 4 % (ref 5–13)
NEUTS SEG # BLD: 8.1 K/UL (ref 1.8–8)
NEUTS SEG NFR BLD: 75 % (ref 32–75)
NITRITE UR QL STRIP.AUTO: NEGATIVE
NRBC # BLD: 0 K/UL (ref 0–0.01)
NRBC BLD-RTO: 0 PER 100 WBC
PH UR STRIP: 6.5 [PH] (ref 5–8)
PLATELET # BLD AUTO: 305 K/UL (ref 150–400)
PMV BLD AUTO: 9.7 FL (ref 8.9–12.9)
POTASSIUM SERPL-SCNC: 3.6 MMOL/L (ref 3.5–5.1)
PROT SERPL-MCNC: 7.7 G/DL (ref 6.4–8.2)
PROT UR STRIP-MCNC: NEGATIVE MG/DL
RBC # BLD AUTO: 4.38 M/UL (ref 3.8–5.2)
RBC #/AREA URNS HPF: ABNORMAL /HPF (ref 0–5)
SAMPLES BEING HELD,HOLD: NORMAL
SODIUM SERPL-SCNC: 136 MMOL/L (ref 136–145)
SP GR UR REFRACTOMETRY: 1.02 (ref 1–1.03)
UROBILINOGEN UR QL STRIP.AUTO: 1 EU/DL (ref 0.2–1)
WBC # BLD AUTO: 10.8 K/UL (ref 3.6–11)
WBC URNS QL MICRO: ABNORMAL /HPF (ref 0–4)

## 2021-07-12 PROCEDURE — 99283 EMERGENCY DEPT VISIT LOW MDM: CPT

## 2021-07-12 PROCEDURE — 80053 COMPREHEN METABOLIC PANEL: CPT

## 2021-07-12 PROCEDURE — 81001 URINALYSIS AUTO W/SCOPE: CPT

## 2021-07-12 PROCEDURE — 85025 COMPLETE CBC W/AUTO DIFF WBC: CPT

## 2021-07-12 PROCEDURE — 36415 COLL VENOUS BLD VENIPUNCTURE: CPT

## 2021-07-12 PROCEDURE — 83690 ASSAY OF LIPASE: CPT

## 2021-07-12 PROCEDURE — 76705 ECHO EXAM OF ABDOMEN: CPT

## 2021-07-12 RX ORDER — DICYCLOMINE HYDROCHLORIDE 10 MG/1
10 CAPSULE ORAL
Qty: 20 CAPSULE | Refills: 0 | Status: SHIPPED | OUTPATIENT
Start: 2021-07-12 | End: 2021-07-22

## 2021-07-12 RX ORDER — FAMOTIDINE 20 MG/1
20 TABLET, FILM COATED ORAL 2 TIMES DAILY
Qty: 30 TABLET | Refills: 0 | Status: SHIPPED | OUTPATIENT
Start: 2021-07-12 | End: 2021-07-27

## 2021-07-13 NOTE — ED TRIAGE NOTES
Patient arrives Ambulator from home with CC of mid abdominal pain that started around 5 pm.  Pain is describes as cramping, radiates to her back. +nausea. Denies fevers.

## 2021-07-13 NOTE — ED PROVIDER NOTES
This is a 74-year-old female with no reported past medical history presents ER today for evaluation of abdominal pain. Patient states that she did not eat all day (due to being busy) and when she came home around 5 PM she ate a mozzarella stick which was followed shortly after by an onset of epigastric pain, which is also associated with nausea. She stated she had several paroxysms where she felt like the pain also radiated to the right side of her back (infrascapular). She did not think much of her symptoms, and thought it was due to the fact that she had not eaten all day so she went on with her plans to get dinner at a restaurant. She states that after eating a few fries at the restaurant, her pain once again intensified. She states that her pain has now completely subsided. She denies any history of similar symptoms.     She denies any fever, vomiting, abdominal distention, chest pain, shortness of breath           Past Medical History:   Diagnosis Date    Abnormal Papanicolaou smear of cervix     ASCUS pos hpv. colpo satisfactory    Gestational diabetes     History of miscarriage, currently pregnant        Past Surgical History:   Procedure Laterality Date    HX  SECTION  2016    HX OTHER SURGICAL      wisdom teeth     HX OTHER SURGICAL      cyst removed from hand         Family History:   Problem Relation Age of Onset    Breast Cancer Maternal Grandmother        Social History     Socioeconomic History    Marital status:      Spouse name: Not on file    Number of children: Not on file    Years of education: Not on file    Highest education level: Not on file   Occupational History    Not on file   Tobacco Use    Smoking status: Never Smoker    Smokeless tobacco: Never Used   Substance and Sexual Activity    Alcohol use: No    Drug use: No    Sexual activity: Not Currently     Partners: Male     Birth control/protection: None   Other Topics Concern     Service Not Asked    Blood Transfusions Not Asked    Caffeine Concern Not Asked    Occupational Exposure Not Asked    Hobby Hazards Not Asked    Sleep Concern Not Asked    Stress Concern Not Asked    Weight Concern Not Asked    Special Diet Not Asked    Back Care Not Asked    Exercise Not Asked    Bike Helmet Not Asked   [address] Not Asked    Self-Exams Not Asked   Social History Narrative    Not on file     Social Determinants of Health     Financial Resource Strain:     Difficulty of Paying Living Expenses:    Food Insecurity:     Worried About Running Out of Food in the Last Year:     [address] in the Last Year:    Transportation Needs:     Lack of Transportation (Medical):  Lack of Transportation (Non-Medical):    Physical Activity:     Days of Exercise per Week:     Minutes of Exercise per Session:    Stress:     Feeling of Stress :    Social Connections:     Frequency of Communication with Friends and Family:     Frequency of Social Gatherings with Friends and Family:     Attends Congregational Services:     Active Member of Clubs or Organizations:     Attends Club or Organization Meetings:     Marital Status:    Intimate Partner Violence:     Fear of Current or Ex-Partner:     Emotionally Abused:     Physically Abused:     Sexually Abused: ALLERGIES: Patient has no known allergies. Review of Systems   Constitutional: Negative for fever. HENT: Negative for sore throat. Eyes: Negative for visual disturbance. Respiratory: Negative for shortness of breath. Cardiovascular: Negative for palpitations. Gastrointestinal: Positive for abdominal pain and nausea. Negative for vomiting. Genitourinary: Negative for dysuria. Musculoskeletal: Negative for myalgias. Skin: Negative for rash. Neurological: Negative for dizziness. Psychiatric/Behavioral: Negative for dysphoric mood.        Vitals:    07/12/21 2010   BP: 129/86   Pulse: 90   Resp: 16   Temp: 97.7 °F (36.5 °C)   SpO2: 99%            Physical Exam  Vitals and nursing note reviewed. Constitutional:       General: She is not in acute distress. Appearance: Normal appearance. She is not ill-appearing. HENT:      Head: Normocephalic and atraumatic. Right Ear: External ear normal.      Left Ear: External ear normal.      Nose: Nose normal.      Mouth/Throat:      Mouth: Mucous membranes are moist.      Pharynx: Oropharynx is clear. Eyes:      General: No scleral icterus. Extraocular Movements: Extraocular movements intact. Conjunctiva/sclera: Conjunctivae normal.      Pupils: Pupils are equal, round, and reactive to light. Cardiovascular:      Rate and Rhythm: Normal rate and regular rhythm. Pulses: Normal pulses. Heart sounds: Normal heart sounds. Pulmonary:      Effort: Pulmonary effort is normal.      Breath sounds: Normal breath sounds. Abdominal:      General: Abdomen is flat. Bowel sounds are normal. There is no distension. Palpations: Abdomen is soft. Tenderness: There is no abdominal tenderness. There is no right CVA tenderness, left CVA tenderness, guarding or rebound. Negative signs include Dinh's sign and McBurney's sign. Musculoskeletal:         General: Normal range of motion. Cervical back: Normal range of motion and neck supple. No rigidity. No muscular tenderness. Skin:     General: Skin is warm and dry. Coloration: Skin is not pale. Neurological:      General: No focal deficit present. Mental Status: She is alert and oriented to person, place, and time. Psychiatric:         Mood and Affect: Mood normal.         Behavior: Behavior normal.         Thought Content:  Thought content normal.         Judgment: Judgment normal.          [facility]      VITAL SIGNS:  Patient Vitals for the past 4 hrs:   Temp Pulse Resp BP SpO2   07/12/21 2010 97.7 °F (36.5 °C) 90 16 129/86 99 %         LABS:  Recent Results (from the past 6 hour(s))   CBC WITH AUTOMATED DIFF    Collection Time: 07/12/21  8:23 PM   Result Value Ref Range    WBC 10.8 3.6 - 11.0 K/uL    RBC 4.38 3.80 - 5.20 M/uL    HGB 12.9 11.5 - 16.0 g/dL    HCT 38.3 35.0 - 47.0 %    MCV 87.4 80.0 - 99.0 FL    MCH 29.5 26.0 - 34.0 PG    MCHC 33.7 30.0 - 36.5 g/dL    RDW 12.5 11.5 - 14.5 %    PLATELET 492 166 - 048 K/uL    MPV 9.7 8.9 - 12.9 FL    NRBC 0.0 0  WBC    ABSOLUTE NRBC 0.00 0.00 - 0.01 K/uL    NEUTROPHILS 75 32 - 75 %    LYMPHOCYTES 20 12 - 49 %    MONOCYTES 4 (L) 5 - 13 %    EOSINOPHILS 1 0 - 7 %    BASOPHILS 0 0 - 1 %    IMMATURE GRANULOCYTES 0 0.0 - 0.5 %    ABS. NEUTROPHILS 8.1 (H) 1.8 - 8.0 K/UL    ABS. LYMPHOCYTES 2.1 0.8 - 3.5 K/UL    ABS. MONOCYTES 0.4 0.0 - 1.0 K/UL    ABS. EOSINOPHILS 0.1 0.0 - 0.4 K/UL    ABS. BASOPHILS 0.0 0.0 - 0.1 K/UL    ABS. IMM. GRANS. 0.0 0.00 - 0.04 K/UL    DF AUTOMATED     METABOLIC PANEL, COMPREHENSIVE    Collection Time: 07/12/21  8:23 PM   Result Value Ref Range    Sodium 136 136 - 145 mmol/L    Potassium 3.6 3.5 - 5.1 mmol/L    Chloride 102 97 - 108 mmol/L    CO2 29 21 - 32 mmol/L    Anion gap 5 5 - 15 mmol/L    Glucose 110 (H) 65 - 100 mg/dL    BUN 6 6 - 20 MG/DL    Creatinine 0.72 0.55 - 1.02 MG/DL    BUN/Creatinine ratio 8 (L) 12 - 20      GFR est AA >60 >60 ml/min/1.73m2    GFR est non-AA >60 >60 ml/min/1.73m2    Calcium 9.0 8.5 - 10.1 MG/DL    Bilirubin, total 0.6 0.2 - 1.0 MG/DL    ALT (SGPT) 20 12 - 78 U/L    AST (SGOT) 13 (L) 15 - 37 U/L    Alk.  phosphatase 49 45 - 117 U/L    Protein, total 7.7 6.4 - 8.2 g/dL    Albumin 4.3 3.5 - 5.0 g/dL    Globulin 3.4 2.0 - 4.0 g/dL    A-G Ratio 1.3 1.1 - 2.2     LIPASE    Collection Time: 07/12/21  8:23 PM   Result Value Ref Range    Lipase 86 73 - 393 U/L   URINALYSIS W/MICROSCOPIC    Collection Time: 07/12/21  8:23 PM   Result Value Ref Range    Color YELLOW/STRAW      Appearance CLEAR CLEAR      Specific gravity 1.020 1.003 - 1.030      pH (UA) 6.5 5.0 - 8.0      Protein Negative NEG mg/dL    Glucose Negative NEG mg/dL    Ketone Negative NEG mg/dL    Bilirubin Negative NEG      Blood TRACE (A) NEG      Urobilinogen 1.0 0.2 - 1.0 EU/dL    Nitrites Negative NEG      Leukocyte Esterase Negative NEG      WBC 0-4 0 - 4 /hpf    RBC 0-5 0 - 5 /hpf    Epithelial cells FEW FEW /lpf    Bacteria 3+ (A) NEG /hpf   SAMPLES BEING HELD    Collection Time: 07/12/21  8:23 PM   Result Value Ref Range    SAMPLES BEING HELD 1PST     COMMENT        Add-on orders for these samples will be processed based on acceptable specimen integrity and analyte stability, which may vary by analyte. IMAGING:  US ABD LTD   Final Result   Normal right upper quadrant ultrasound. Medications During Visit:  Medications - No data to display      DECISION MAKING:  Mainor Deutsch is a 27 y.o. female who comes in as above. Labs, urine, ultrasound unremarkable. Patient remains pain-free. Postprandial epigastric pain could be due to various etiologies and she is encouraged to start taking famotidine twice daily for the next 15 days and Bentyl as needed for pain control. Recommended follow-up with gastroenterology for further management. Return for worsening symptoms, fever, vomiting. The clinical decision making for this encounter included ordering and interpreting the above diagnostic tests with comparison to prior studies that are within our EMR. Past medical and surgical histories were reviewed, as were records from recent outpatient and emergency department visits. The above results discussed and reviewed with the patient. Patient verbalized understanding of the care plan, including any changes to current outpatient medication regimen, discussed disease process, symptom control, and follow-up care. Return precautions reviewed. IMPRESSION:  1.  Abdominal pain, epigastric        DISPOSITION:  Discharged      Current Discharge Medication List      START taking these medications    Details   famotidine (PEPCID) 20 mg tablet Take 1 Tablet by mouth two (2) times a day for 15 days. Qty: 30 Tablet, Refills: 0  Start date: 7/12/2021, End date: 7/27/2021      dicyclomine (BENTYL) 10 mg capsule Take 1 Capsule by mouth four (4) times daily as needed for Cramping or Pain for up to 10 days. Qty: 20 Capsule, Refills: 0  Start date: 7/12/2021, End date: 7/22/2021              Follow-up Information     Follow up With Specialties Details Why Contact Info    Maria Del Carmen Lewis MD Gastroenterology Schedule an appointment as soon as possible for a visit   [address] [place] [address]  [phone]              The patient is asked to follow-up with their primary care provider in the next several days. They are to call tomorrow for an appointment. The patient is asked to return promptly for any increased concerns or worsening of symptoms. They can return to this emergency department or any other emergency department.       Procedures

## 2022-03-18 PROBLEM — E66.01 SEVERE OBESITY (HCC): Status: ACTIVE | Noted: 2019-02-08

## 2022-03-19 PROBLEM — Z3A.12 12 WEEKS GESTATION OF PREGNANCY: Status: ACTIVE | Noted: 2018-06-19

## 2022-03-19 PROBLEM — Z98.891 S/P REPEAT LOW TRANSVERSE C-SECTION: Status: ACTIVE | Noted: 2018-12-28

## 2024-05-22 NOTE — PROGRESS NOTES
Annual Well Women Exam    Eloise Mazariegos is a 34 y.o. presenting for annual exam. Her main concerns today include annual well women exam. Bilateral breast burning.     She declines a chaperone during the gynecologic exam today.   Patient is a dentist   Previously saw Dr. Rascon. Delivered both of her babies.         Past Medical History:   Diagnosis Date    Abnormal Papanicolaou smear of cervix     ASCUS pos hpv. colpo satisfactory    Gestational diabetes     History of miscarriage, currently pregnant        Past Surgical History:   Procedure Laterality Date     SECTION      OTHER SURGICAL HISTORY      wisdom teeth     OTHER SURGICAL HISTORY      cyst removed from hand       Family History   Problem Relation Age of Onset    Breast Cancer Maternal Grandmother        Social History     Socioeconomic History    Marital status:      Spouse name: Not on file    Number of children: Not on file    Years of education: Not on file    Highest education level: Not on file   Occupational History    Not on file   Tobacco Use    Smoking status: Never    Smokeless tobacco: Never   Substance and Sexual Activity    Alcohol use: No    Drug use: No    Sexual activity: Not on file   Other Topics Concern    Not on file   Social History Narrative    Not on file     Social Determinants of Health     Financial Resource Strain: Not on file   Food Insecurity: Not on file   Transportation Needs: Not on file   Physical Activity: Not on file   Stress: Not on file   Social Connections: Not on file   Intimate Partner Violence: Not on file   Housing Stability: Not on file       No current outpatient medications on file.     No current facility-administered medications for this visit.       Not on File    Review of Systems - History obtained from the patient  Constitutional: negative for weight loss, fever, night sweats  HEENT: negative for hearing loss, earache, congestion, snoring, sorethroat  CV: negative for chest pain,

## 2024-05-23 ENCOUNTER — OFFICE VISIT (OUTPATIENT)
Age: 34
End: 2024-05-23

## 2024-05-23 VITALS
BODY MASS INDEX: 34.88 KG/M2 | SYSTOLIC BLOOD PRESSURE: 100 MMHG | HEIGHT: 59 IN | WEIGHT: 173 LBS | DIASTOLIC BLOOD PRESSURE: 64 MMHG

## 2024-05-23 DIAGNOSIS — N64.4 BREAST PAIN: ICD-10-CM

## 2024-05-23 DIAGNOSIS — Z01.419 WELL WOMAN EXAM: Primary | ICD-10-CM

## 2024-05-23 PROCEDURE — 99385 PREV VISIT NEW AGE 18-39: CPT | Performed by: OBSTETRICS & GYNECOLOGY

## 2024-05-23 SDOH — ECONOMIC STABILITY: FOOD INSECURITY: WITHIN THE PAST 12 MONTHS, THE FOOD YOU BOUGHT JUST DIDN'T LAST AND YOU DIDN'T HAVE MONEY TO GET MORE.: NEVER TRUE

## 2024-05-23 SDOH — ECONOMIC STABILITY: HOUSING INSECURITY
IN THE LAST 12 MONTHS, WAS THERE A TIME WHEN YOU DID NOT HAVE A STEADY PLACE TO SLEEP OR SLEPT IN A SHELTER (INCLUDING NOW)?: NO

## 2024-05-23 SDOH — ECONOMIC STABILITY: FOOD INSECURITY: WITHIN THE PAST 12 MONTHS, YOU WORRIED THAT YOUR FOOD WOULD RUN OUT BEFORE YOU GOT MONEY TO BUY MORE.: NEVER TRUE

## 2024-05-23 SDOH — ECONOMIC STABILITY: INCOME INSECURITY: HOW HARD IS IT FOR YOU TO PAY FOR THE VERY BASICS LIKE FOOD, HOUSING, MEDICAL CARE, AND HEATING?: NOT HARD AT ALL

## 2024-05-23 ASSESSMENT — PATIENT HEALTH QUESTIONNAIRE - PHQ9
SUM OF ALL RESPONSES TO PHQ QUESTIONS 1-9: 0
2. FEELING DOWN, DEPRESSED OR HOPELESS: NOT AT ALL
SUM OF ALL RESPONSES TO PHQ QUESTIONS 1-9: 0
1. LITTLE INTEREST OR PLEASURE IN DOING THINGS: NOT AT ALL
SUM OF ALL RESPONSES TO PHQ9 QUESTIONS 1 & 2: 0

## 2024-05-23 NOTE — PROGRESS NOTES
ANNUAL EXAM - NEW PATIENT    Eloise Mazariegos is a 34 y.o.  presenting for annual exam. Her main concerns today include     No chief complaint on file.      Ob/Gyn Hx:    LMP - No LMP recorded.  Menses - ***   Contraception - ***  Hx of STI - ***  SA - ***    Health maintenance:  Last Pap: 2018 ASCUS  Gardasil: ***    1. Have you been to the ER, urgent care clinic, or hospitalized since your last visit? NEW PATIENT    2. Have you seen or consulted any other health care providers outside of the [facility] since your last visit?  NEW PATIENT    Patient {declines/accept:81231} chaperone.    KIRSTIN THOMPSON RN

## 2024-05-23 NOTE — PROGRESS NOTES
ANNUAL EXAM - NEW PATIENT    Eloise Mazariegos is a 34 y.o.  presenting for annual exam. Her main concerns today include pap and wellness visit.    Has been having burning in her breast. States this has happened 10 times in the past 4mo.    Chief Complaint   Patient presents with    Annual Exam       Ob/Gyn Hx:  : 2016 cris and 2018 ceasar both c/s  LMP - 2024  Menses - regular last 2-3 days   Contraception - none  Hx of STI - declines testing  SA - yes,     Health maintenance:  Last Pap: 2018 ASCUS  Gardasil: unsure    1. Have you been to the ER, urgent care clinic, or hospitalized since your last visit? NEW PATIENT    2. Have you seen or consulted any other health care providers outside of the [facility] since your last visit?  NEW PATIENT    Patient declines chaperone.    Lauren Mitchell LPN

## 2024-06-04 LAB
CYTOLOGIST CVX/VAG CYTO: ABNORMAL
CYTOLOGY CVX/VAG DOC CYTO: ABNORMAL
CYTOLOGY CVX/VAG DOC THIN PREP: ABNORMAL
DX ICD CODE: ABNORMAL
DX ICD CODE: ABNORMAL
HPV GENOTYPE REFLEX: ABNORMAL
HPV I/H RISK 4 DNA CVX QL PROBE+SIG AMP: NEGATIVE
Lab: ABNORMAL
OTHER STN SPEC: ABNORMAL
PATHOLOGIST CVX/VAG CYTO: ABNORMAL
STAT OF ADQ CVX/VAG CYTO-IMP: ABNORMAL

## 2024-06-05 ENCOUNTER — TELEPHONE (OUTPATIENT)
Age: 34
End: 2024-06-05

## 2024-08-06 ENCOUNTER — INITIAL PRENATAL (OUTPATIENT)
Age: 34
End: 2024-08-06

## 2024-08-06 VITALS — SYSTOLIC BLOOD PRESSURE: 128 MMHG | DIASTOLIC BLOOD PRESSURE: 82 MMHG | WEIGHT: 192 LBS | BODY MASS INDEX: 38.78 KG/M2

## 2024-08-06 DIAGNOSIS — Z34.90 PREGNANCY, UNSPECIFIED GESTATIONAL AGE: Primary | ICD-10-CM

## 2024-08-06 LAB
C. TRACHOMATIS, EXTERNAL RESULT: NEGATIVE
ERYTHROCYTE [DISTWIDTH] IN BLOOD BY AUTOMATED COUNT: 13 % (ref 11.5–14.5)
HBV SURFACE AG SER QL: <0.1 INDEX
HBV SURFACE AG SER QL: NEGATIVE
HCT VFR BLD AUTO: 36 % (ref 35–47)
HCV AB SER IA-ACNC: 0.03 INDEX
HCV AB SERPL QL IA: NONREACTIVE
HEP B, EXTERNAL RESULT: NEGATIVE
HGB BLD-MCNC: 12.7 G/DL (ref 11.5–16)
HIV 1+2 AB+HIV1 P24 AG SERPL QL IA: NONREACTIVE
HIV 1/2 RESULT COMMENT: NORMAL
HIV, EXTERNAL RESULT: NONREACTIVE
MCH RBC QN AUTO: 30.7 PG (ref 26–34)
MCHC RBC AUTO-ENTMCNC: 35.3 G/DL (ref 30–36.5)
MCV RBC AUTO: 87 FL (ref 80–99)
N. GONORRHOEAE, EXTERNAL RESULT: NEGATIVE
NRBC # BLD: 0 K/UL (ref 0–0.01)
NRBC BLD-RTO: 0 PER 100 WBC
PLATELET # BLD AUTO: 266 K/UL (ref 150–400)
PMV BLD AUTO: 10.6 FL (ref 8.9–12.9)
RBC # BLD AUTO: 4.14 M/UL (ref 3.8–5.2)
RUBELLA TITER, EXTERNAL RESULT: NORMAL
RUBV IGG SERPL IA-ACNC: NORMAL IU/ML
WBC # BLD AUTO: 9.4 K/UL (ref 3.6–11)

## 2024-08-06 PROCEDURE — 0500F INITIAL PRENATAL CARE VISIT: CPT | Performed by: OBSTETRICS & GYNECOLOGY

## 2024-08-06 RX ORDER — ASPIRIN 81 MG/1
81 TABLET ORAL DAILY
Qty: 30 TABLET | Refills: 6 | Status: SHIPPED | OUTPATIENT
Start: 2024-08-06 | End: 2024-08-06 | Stop reason: ALTCHOICE

## 2024-08-06 NOTE — PROGRESS NOTES
Initial Prenatal Note    CC: Amenorrhea, positive pregnancy test    HPI:  Eloise Mazariegos is a 34 y.o. No obstetric history on file. who presents for initial prenatal appointment. Since her LMP she has experienced nausea and occasional vomiting. She denies dysuria, discharge, vaginal bleeding.    Declines genetic or carrier testing. Would like to keep gender a surprise!!       LMP history:  The date of her LMP is 2024 certain.  Her last menstrual period was normal.    Based on her LMP, her EDC is 02/10/2024 and her EGA is 13 weeks,1 days.     Ultrasound today 2024:  TA ULTRASOUND PERFORMED A SINGLE VIABLE 13W5D WITH LIDIA OF 2025 IUP IS SEEN WITH NORMAL CARDIAC RHYTHM. GESTATIONAL AGE BASED ON TODAY'S ULTRASOUND. THERE APPEARS TO BE A ANTERIOR PLACENTA. RIGHT OVARY APPEARS WNL. LEFT OVARY APPEARS WNL. NO FREE FLUID IS SEEN IN THE CDS.     She is dated by [facility].    Relevant past pregnancy history:  She has the following pregnancy history: 2 prev c/s, 2 AB   She does not history of  delivery.    Relevant past medical history:   Noncontributory    Last Pap: 2024 LSIL/HPV neg    Relevant social history:  Her occupation is: Dentist.   Her partner's name is Leon Solis SR.  Gila sister - Careysierra Uriostegui brother - Leon    1. Have you been to the ER, urgent care clinic, or hospitalized since your last visit?No    2. Have you seen or consulted any other health care providers outside of the [facility] since your last visit? No    She declines  a chaperone during the gynecologic exam today.      Ya England RN

## 2024-08-06 NOTE — PROGRESS NOTES
Current pregnancy history:      Eloise Mazariegos is a 34 y.o. No obstetric history on file. who presents for initial prenatal appointment. Since her LMP she has experienced nausea and occasional vomiting. She denies dysuria, discharge, vaginal bleeding.     Declines genetic or carrier testing. Would like to keep gender a surprise!!         LMP history:  The date of her LMP is 2024 certain.  Her last menstrual period was normal.     Based on her LMP, her EDC is 02/10/2024 and her EGA is 13 weeks,1 days.     Ultrasound today 2024:  TA ULTRASOUND PERFORMED A SINGLE VIABLE 13W5D WITH LIDIA OF 2025 IUP IS SEEN WITH NORMAL CARDIAC RHYTHM. GESTATIONAL AGE BASED ON TODAY'S ULTRASOUND. THERE APPEARS TO BE A ANTERIOR PLACENTA. RIGHT OVARY APPEARS WNL. LEFT OVARY APPEARS WNL. NO FREE FLUID IS SEEN IN THE CDS.      She is dated by [facility].     Relevant past pregnancy history:  She has the following pregnancy history: 2 prev c/s, 2 AB   She does not history of  delivery.     Relevant past medical history:   Noncontributory    Last Pap: 2024 LSIL/HPV neg     Relevant social history:  Her occupation is: Dentist.   Her partner's name is Leon Solis .  Gila sister - Carey Uriostegui brother - Leon  Past pregnancy history:  No obstetric history on file.      _ _ _ _ _ _ _ _ _ _ _ _ _ _ _ _ _ _ _ _ _ _ _ _ _ _ _ _ _ _ _ _     Substance history: negative for alcohol, tobacco and street drugs.           Positive for nothing.  Exposure history:   There is/are no indoor cat/s in the home.  The patient was instructed to not change the cat litter.   She admits close contact with children on a regular basis.   She has had chicken pox or the vaccine in the past.   Patient denies issues with domestic violence.     Genetic Screening/Teratology Counseling: (Includes patient, baby's father, or anyone in either family with:)  1.  Patient's age >/= 35 at EDC?--no  2.  Thalassemia (Japanese, Greek, Mediterranean, or

## 2024-08-07 LAB
ABO + RH BLD: NORMAL
BLOOD BANK CMNT PATIENT-IMP: NORMAL
BLOOD GROUP ANTIBODIES SERPL: NORMAL
SPECIMEN EXP DATE BLD: NORMAL
T PALLIDUM AB SER QL IA: NON REACTIVE
VZV IGG SER IA-ACNC: 1062 INDEX

## 2024-08-08 LAB
C TRACH RRNA SPEC QL NAA+PROBE: NEGATIVE
N GONORRHOEA RRNA SPEC QL NAA+PROBE: NEGATIVE
SPECIMEN SOURCE: NORMAL
T VAGINALIS RRNA SPEC QL NAA+PROBE: NEGATIVE

## 2024-08-09 LAB
BACTERIA SPEC CULT: ABNORMAL
CC UR VC: ABNORMAL
SERVICE CMNT-IMP: ABNORMAL

## 2024-08-12 LAB
HGB A MFR BLD: 97.3 % (ref 96.4–98.8)
HGB A2 MFR BLD COLUMN CHROM: 2.7 % (ref 1.8–3.2)
HGB F MFR BLD: 0 % (ref 0–2)
HGB FRACT BLD-IMP: NORMAL
HGB S MFR BLD: 0 %

## 2024-08-16 ENCOUNTER — LAB (OUTPATIENT)
Age: 34
End: 2024-08-16

## 2024-08-16 DIAGNOSIS — Z34.90 PREGNANCY, UNSPECIFIED GESTATIONAL AGE: Primary | ICD-10-CM

## 2024-08-17 RX ORDER — SULFAMETHOXAZOLE AND TRIMETHOPRIM 400; 80 MG/1; MG/1
1 TABLET ORAL 2 TIMES DAILY
Qty: 20 TABLET | Refills: 0 | Status: SHIPPED | OUTPATIENT
Start: 2024-08-17 | End: 2024-08-27

## 2024-08-18 LAB
BACTERIA SPEC CULT: ABNORMAL
CC UR VC: ABNORMAL
SERVICE CMNT-IMP: ABNORMAL

## 2024-09-13 DIAGNOSIS — Z34.90 PREGNANCY, UNSPECIFIED GESTATIONAL AGE: Primary | ICD-10-CM

## 2024-09-24 ENCOUNTER — ROUTINE PRENATAL (OUTPATIENT)
Age: 34
End: 2024-09-24

## 2024-09-24 VITALS
WEIGHT: 179 LBS | BODY MASS INDEX: 36.08 KG/M2 | TEMPERATURE: 98.2 F | DIASTOLIC BLOOD PRESSURE: 83 MMHG | RESPIRATION RATE: 12 BRPM | OXYGEN SATURATION: 99 % | HEIGHT: 59 IN | HEART RATE: 84 BPM | SYSTOLIC BLOOD PRESSURE: 128 MMHG

## 2024-09-24 DIAGNOSIS — Z34.90 PREGNANCY, UNSPECIFIED GESTATIONAL AGE: Primary | ICD-10-CM

## 2024-09-24 PROCEDURE — 0502F SUBSEQUENT PRENATAL CARE: CPT | Performed by: OBSTETRICS & GYNECOLOGY

## 2024-09-25 LAB
BACTERIA SPEC CULT: NORMAL
CC UR VC: NORMAL
SERVICE CMNT-IMP: NORMAL

## 2024-09-26 ENCOUNTER — ROUTINE PRENATAL (OUTPATIENT)
Age: 34
End: 2024-09-26

## 2024-09-26 VITALS — HEART RATE: 90 BPM | DIASTOLIC BLOOD PRESSURE: 84 MMHG | SYSTOLIC BLOOD PRESSURE: 122 MMHG

## 2024-09-26 DIAGNOSIS — O28.3 ABNORMAL ULTRASONIC FINDING ON ANTENATAL SCREENING OF MOTHER: Primary | ICD-10-CM

## 2024-09-26 DIAGNOSIS — Z3A.21 21 WEEKS GESTATION OF PREGNANCY: ICD-10-CM

## 2024-09-26 DIAGNOSIS — E66.01 SEVERE OBESITY: Primary | ICD-10-CM

## 2024-09-26 DIAGNOSIS — O09.522 SUPERVISION OF ELDERLY MULTIGRAVIDA IN SECOND TRIMESTER: ICD-10-CM

## 2024-09-26 PROCEDURE — 99205 OFFICE O/P NEW HI 60 MIN: CPT | Performed by: STUDENT IN AN ORGANIZED HEALTH CARE EDUCATION/TRAINING PROGRAM

## 2024-09-26 PROCEDURE — 76816 OB US FOLLOW-UP PER FETUS: CPT | Performed by: STUDENT IN AN ORGANIZED HEALTH CARE EDUCATION/TRAINING PROGRAM

## 2024-10-21 ENCOUNTER — ROUTINE PRENATAL (OUTPATIENT)
Age: 34
End: 2024-10-21

## 2024-10-21 VITALS — OXYGEN SATURATION: 98 % | SYSTOLIC BLOOD PRESSURE: 105 MMHG | HEART RATE: 108 BPM | DIASTOLIC BLOOD PRESSURE: 68 MMHG

## 2024-10-21 DIAGNOSIS — Z3A.24 24 WEEKS GESTATION OF PREGNANCY: Primary | ICD-10-CM

## 2024-10-21 PROCEDURE — 99212 OFFICE O/P EST SF 10 MIN: CPT | Performed by: OBSTETRICS & GYNECOLOGY

## 2024-10-21 PROCEDURE — 76816 OB US FOLLOW-UP PER FETUS: CPT | Performed by: OBSTETRICS & GYNECOLOGY

## 2024-10-24 RX ORDER — ASPIRIN 81 MG/1
81 TABLET ORAL DAILY
Qty: 30 TABLET | Refills: 6 | Status: SHIPPED | OUTPATIENT
Start: 2024-10-24

## 2024-10-24 NOTE — PROCEDURES
PATIENT: TERENCE CLINTON   -  : 1990   -  DOS:10/21/2024   -  INTERPRETING PROVIDER:Rea Brooks,   Indication  ========    abnormal findings on outside scan- lemon sign, teratoma, RAA    Method  ======    Transabdominal ultrasound examination. View: Suboptimal view: limited by fetal position    Pregnancy  =========    Mishra pregnancy. Number of fetuses: 1    Dating  ======    LMP on: 2024  Cycle: regular cycle  GA by LMP 24 w + 0 d  LIDIA by LMP: 2/10/2025  Previous Ultrasound on: 2024  Type of prior assessment: GA  GA at prior assessment date 13 w + 5 d  GA by previous U/S 24 w + 4 d  LIDIA by previous Ultrasound: 2025  Ultrasound examination on: 10/21/2024  GA by U/S based upon: AC, BPD, Femur, HC  GA by U/S 25 w + 0 d  LIDIA by U/S: 2/3/2025  Assigned: based on ultrasound (GA), selected on 2024  Assigned GA 24 w + 4 d  Assigned LIDIA: 2025    Fetal Biometry  ============    Standard  BPD 61.4 mm 25w 0d 57% Hadlock  OFD 81.2 mm 26w 3d 95% Hubert  .7 mm 24w 6d 38% Hadlock  Cerebellum tr 26.9 mm 24w 0d 45% Hill  .5 mm 25w 3d 68% Hadlock  Femur 44.8 mm 24w 5d 43% Hadlock   g 24w 6d 64% Hadlock  EFW (lb) 1 lb  EFW (oz) 11 oz  EFW by: Hadlock (BPD-HC-AC-FL)  Extended   4.5 mm  CM 7.5 mm  87% Nicolaides  Other Structures   bpm    General Evaluation  ==============    Cardiac activity present.  bpm. Fetal movements: visualized. Presentation: Transverse, head to maternal right  Placenta: Placental site: Anterior, appropriate distance from the internal os  Umbilical cord: Cord vessels: 2 VESSEL CORD. Insertion site: central  Amniotic fluid: Amount of AF: normal. MVP 4.3 cm    Fetal Anatomy  ===========    4-chamber view: normal  RVOT view: normal  LVOT view: normal  3-vessel view: normal  3-vessel-trachea view: normal  Stomach: normal  Kidneys: normal  Bladder: normal  Rt arm: normal  Rt hand: normal  Rt fingers: normal  Rt foot: normal  Lt

## 2024-10-25 ENCOUNTER — TELEPHONE (OUTPATIENT)
Age: 34
End: 2024-10-25

## 2024-10-25 NOTE — TELEPHONE ENCOUNTER
Attempted to contact patient via telephone message. Voicemail left. Patient advised to return office call as we have not seen her in about a month and would want to see her next week. [facility] message sent to patient as well.     ----- Message from Dr. Alysa Marsh MD sent at 10/24/2024  7:12 PM EDT -----  This patient has not been seen in a month and has no upcoming apts  Sent to [facility] for lemon sign. Most recent US with [facility] normal.   They would like her to start baby ASA for preeclampsia prevention.   I have sent this to her pharmacy.   Can you call and check on her and get her scheduled. Thanks

## 2024-10-28 ENCOUNTER — TELEPHONE (OUTPATIENT)
Age: 34
End: 2024-10-28

## 2024-10-28 ENCOUNTER — ROUTINE PRENATAL (OUTPATIENT)
Age: 34
End: 2024-10-28

## 2024-10-28 VITALS
HEART RATE: 103 BPM | WEIGHT: 184 LBS | SYSTOLIC BLOOD PRESSURE: 127 MMHG | OXYGEN SATURATION: 98 % | RESPIRATION RATE: 14 BRPM | HEIGHT: 59 IN | DIASTOLIC BLOOD PRESSURE: 84 MMHG | BODY MASS INDEX: 37.09 KG/M2 | TEMPERATURE: 98.1 F

## 2024-10-28 DIAGNOSIS — Z34.90 PREGNANCY, UNSPECIFIED GESTATIONAL AGE: Primary | ICD-10-CM

## 2024-10-28 PROCEDURE — 0502F SUBSEQUENT PRENATAL CARE: CPT | Performed by: OBSTETRICS & GYNECOLOGY

## 2024-10-28 NOTE — PROGRESS NOTES
Patient here for return OB visit at 25w4d. She reports no concerns.    Discussed fetal ECHO. Patient declines after thoughtful discussion.   Discussed growth US given AMA and 2VC. Eloise is uncertain but is going to think about it.      She reports good fetal movement.   She denies vaginal bleeding, loss of fluid, abnormal vaginal discharge, UTI symptoms, cramping, or contractions.       /84 (Site: Right Upper Arm, Position: Sitting)   Pulse (!) 103   Temp 98.1 °F (36.7 °C) (Oral)   Resp 14   Ht 1.499 m (4' 11\")   Wt 83.5 kg (184 lb)   LMP 2024   SpO2 98%   BMI 37.16 kg/m²          Prenatal Fetal Information  Fetal HR: 145  Movement: Present      Patient Active Problem List    Diagnosis Date Noted    Severe obesity 2019    S/P repeat low transverse  2018    Pregnancy 2018     Primary Provider: Maximo.    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 Follow up US with MFM normal. 2VC  3rd TM labs: GTT___ Hgb___ Plts___  Vaccines:   Flu:__ TDAP:__ RSV ___ 32-36 weeks September-January   Rhogam: O POSITIVE     GBS:    Pain management in labor :   -Epidural       Postpartum  -Breast/Bottle   -Circ  -Pap      Problem List:  1) Concern for Craniosynostosis on anatomy  -follow up US with MFM normal  2) Csx2  -planning repeat            Return for follow up by 28 weeks for glucola .    Alysa Marsh MD

## 2024-10-28 NOTE — PROGRESS NOTES
Autumn Mayers is 25w4d   Doing well  +FM  Denies LOF, bleeding/spotting, cramping, headache, blurred vision, edema, or RUQ pain.

## 2024-10-28 NOTE — TELEPHONE ENCOUNTER
Spoke to patient via telephone call, name and  confirmed. Following up with patient as she has not been seen in about 4 weeks and has no follow up appointments scheduled. Patient states she was under the impression she didn't need to be seen since she was seeing the high risk doctors. Patient made aware that this is not the case, and Dr. Marsh would like to see her this week. Patient is unable to attend appointment this week. Patient offered an appointment at her convenience but unable to schedule at this time. Patient to call back to schedule appointment.     ----- Message from Dr. Alysa Marsh MD sent at 10/24/2024  7:12 PM EDT -----  This patient has not been seen in a month and has no upcoming apts  Sent to [facility] for lemon sign. Most recent US with [facility] normal.   They would like her to start baby ASA for preeclampsia prevention.   I have sent this to her pharmacy.   Can you call and check on her and get her scheduled. Thanks

## 2024-11-22 ENCOUNTER — LAB (OUTPATIENT)
Age: 34
End: 2024-11-22

## 2024-11-22 ENCOUNTER — ROUTINE PRENATAL (OUTPATIENT)
Age: 34
End: 2024-11-22

## 2024-11-22 VITALS
SYSTOLIC BLOOD PRESSURE: 126 MMHG | DIASTOLIC BLOOD PRESSURE: 80 MMHG | BODY MASS INDEX: 37.37 KG/M2 | HEART RATE: 98 BPM | WEIGHT: 185 LBS | OXYGEN SATURATION: 98 %

## 2024-11-22 DIAGNOSIS — Z34.80 PRENATAL CARE OF MULTIGRAVIDA, ANTEPARTUM: Primary | ICD-10-CM

## 2024-11-22 DIAGNOSIS — Z34.90 PREGNANCY, UNSPECIFIED GESTATIONAL AGE: Primary | ICD-10-CM

## 2024-11-22 DIAGNOSIS — Z34.90 PREGNANCY, UNSPECIFIED GESTATIONAL AGE: ICD-10-CM

## 2024-11-22 LAB
BLOOD BANK CMNT PATIENT-IMP: NORMAL
BLOOD GROUP ANTIBODIES SERPL: NORMAL
ERYTHROCYTE [DISTWIDTH] IN BLOOD BY AUTOMATED COUNT: 12.1 % (ref 11.5–14.5)
GLUCOSE 1H P 100 G GLC PO SERPL-MCNC: 117 MG/DL (ref 65–140)
HCT VFR BLD AUTO: 32.8 % (ref 35–47)
HGB BLD-MCNC: 11.3 G/DL (ref 11.5–16)
MCH RBC QN AUTO: 31 PG (ref 26–34)
MCHC RBC AUTO-ENTMCNC: 34.5 G/DL (ref 30–36.5)
MCV RBC AUTO: 89.9 FL (ref 80–99)
NRBC # BLD: 0 K/UL (ref 0–0.01)
NRBC BLD-RTO: 0 PER 100 WBC
PLATELET # BLD AUTO: 239 K/UL (ref 150–400)
PMV BLD AUTO: 11.7 FL (ref 8.9–12.9)
RBC # BLD AUTO: 3.65 M/UL (ref 3.8–5.2)
T. PALLIDUM (SYPHILIS) ANTIBODY, EXTERNAL RESULT: NONREACTIVE
WBC # BLD AUTO: 12.7 K/UL (ref 3.6–11)

## 2024-11-22 PROCEDURE — 0502F SUBSEQUENT PRENATAL CARE: CPT | Performed by: OBSTETRICS & GYNECOLOGY

## 2024-11-22 PROCEDURE — 90715 TDAP VACCINE 7 YRS/> IM: CPT | Performed by: OBSTETRICS & GYNECOLOGY

## 2024-11-22 PROCEDURE — 90471 IMMUNIZATION ADMIN: CPT | Performed by: OBSTETRICS & GYNECOLOGY

## 2024-11-22 NOTE — PROGRESS NOTES
So happy to see Eloise; longstanding pt of mine; will assume care from Dr Marsh due to length of our relationship  Plans for RLTCS; tentatively set for 1/30; declines prophyl salping  Glucola today

## 2024-11-26 LAB — T PALLIDUM AB SER QL IA: NON REACTIVE

## 2024-12-03 ENCOUNTER — ROUTINE PRENATAL (OUTPATIENT)
Age: 34
End: 2024-12-03

## 2024-12-03 VITALS
OXYGEN SATURATION: 99 % | DIASTOLIC BLOOD PRESSURE: 85 MMHG | BODY MASS INDEX: 37.16 KG/M2 | SYSTOLIC BLOOD PRESSURE: 107 MMHG | WEIGHT: 184 LBS

## 2024-12-03 DIAGNOSIS — Z34.90 PREGNANCY, UNSPECIFIED GESTATIONAL AGE: ICD-10-CM

## 2024-12-03 DIAGNOSIS — Z34.80 PRENATAL CARE OF MULTIGRAVIDA, ANTEPARTUM: Primary | ICD-10-CM

## 2024-12-03 PROCEDURE — 0502F SUBSEQUENT PRENATAL CARE: CPT | Performed by: OBSTETRICS & GYNECOLOGY

## 2024-12-17 ENCOUNTER — ROUTINE PRENATAL (OUTPATIENT)
Age: 34
End: 2024-12-17

## 2024-12-17 VITALS
DIASTOLIC BLOOD PRESSURE: 87 MMHG | HEART RATE: 110 BPM | OXYGEN SATURATION: 97 % | BODY MASS INDEX: 37.77 KG/M2 | WEIGHT: 187 LBS | SYSTOLIC BLOOD PRESSURE: 131 MMHG

## 2024-12-17 DIAGNOSIS — Z34.80 PRENATAL CARE OF MULTIGRAVIDA, ANTEPARTUM: Primary | ICD-10-CM

## 2024-12-17 PROCEDURE — 0502F SUBSEQUENT PRENATAL CARE: CPT | Performed by: OBSTETRICS & GYNECOLOGY

## 2024-12-31 ENCOUNTER — ROUTINE PRENATAL (OUTPATIENT)
Age: 34
End: 2024-12-31

## 2024-12-31 VITALS
SYSTOLIC BLOOD PRESSURE: 115 MMHG | BODY MASS INDEX: 38.17 KG/M2 | DIASTOLIC BLOOD PRESSURE: 82 MMHG | HEART RATE: 98 BPM | WEIGHT: 189 LBS

## 2024-12-31 DIAGNOSIS — Z34.80 PRENATAL CARE OF MULTIGRAVIDA, ANTEPARTUM: Primary | ICD-10-CM

## 2024-12-31 PROCEDURE — 0502F SUBSEQUENT PRENATAL CARE: CPT | Performed by: OBSTETRICS & GYNECOLOGY

## 2025-01-13 SDOH — ECONOMIC STABILITY: TRANSPORTATION INSECURITY
IN THE PAST 12 MONTHS, HAS LACK OF TRANSPORTATION KEPT YOU FROM MEETINGS, WORK, OR FROM GETTING THINGS NEEDED FOR DAILY LIVING?: NO

## 2025-01-13 SDOH — ECONOMIC STABILITY: FOOD INSECURITY: WITHIN THE PAST 12 MONTHS, THE FOOD YOU BOUGHT JUST DIDN'T LAST AND YOU DIDN'T HAVE MONEY TO GET MORE.: NEVER TRUE

## 2025-01-13 SDOH — ECONOMIC STABILITY: INCOME INSECURITY: IN THE LAST 12 MONTHS, WAS THERE A TIME WHEN YOU WERE NOT ABLE TO PAY THE MORTGAGE OR RENT ON TIME?: NO

## 2025-01-13 SDOH — ECONOMIC STABILITY: FOOD INSECURITY: WITHIN THE PAST 12 MONTHS, YOU WORRIED THAT YOUR FOOD WOULD RUN OUT BEFORE YOU GOT MONEY TO BUY MORE.: NEVER TRUE

## 2025-01-13 SDOH — ECONOMIC STABILITY: TRANSPORTATION INSECURITY
IN THE PAST 12 MONTHS, HAS THE LACK OF TRANSPORTATION KEPT YOU FROM MEDICAL APPOINTMENTS OR FROM GETTING MEDICATIONS?: NO

## 2025-01-14 ENCOUNTER — ROUTINE PRENATAL (OUTPATIENT)
Age: 35
End: 2025-01-14

## 2025-01-14 VITALS
DIASTOLIC BLOOD PRESSURE: 84 MMHG | HEART RATE: 103 BPM | WEIGHT: 191 LBS | OXYGEN SATURATION: 99 % | BODY MASS INDEX: 38.58 KG/M2 | SYSTOLIC BLOOD PRESSURE: 132 MMHG

## 2025-01-14 DIAGNOSIS — O09.523 MULTIGRAVIDA OF ADVANCED MATERNAL AGE IN THIRD TRIMESTER: Primary | ICD-10-CM

## 2025-01-14 DIAGNOSIS — Z34.80 PRENATAL CARE OF MULTIGRAVIDA, ANTEPARTUM: Primary | ICD-10-CM

## 2025-01-14 LAB — GBS, EXTERNAL RESULT: NEGATIVE

## 2025-01-14 PROCEDURE — 0502F SUBSEQUENT PRENATAL CARE: CPT | Performed by: OBSTETRICS & GYNECOLOGY

## 2025-01-14 NOTE — PROGRESS NOTES
Verbal order obtained from Lyric Rascon MD for ultrasound and a diagnosis of AMA. Order read back to MD. Orders signed by this writer and sent for co-sign to MD.

## 2025-01-17 LAB
GP B STREP DNA SPEC QL NAA+PROBE: NEGATIVE
SPECIMEN SOURCE: NORMAL

## 2025-01-21 ENCOUNTER — ROUTINE PRENATAL (OUTPATIENT)
Age: 35
End: 2025-01-21

## 2025-01-21 VITALS
DIASTOLIC BLOOD PRESSURE: 88 MMHG | BODY MASS INDEX: 38.58 KG/M2 | WEIGHT: 191 LBS | SYSTOLIC BLOOD PRESSURE: 133 MMHG | HEART RATE: 96 BPM

## 2025-01-21 DIAGNOSIS — Z34.80 PRENATAL CARE OF MULTIGRAVIDA, ANTEPARTUM: Primary | ICD-10-CM

## 2025-01-21 PROCEDURE — 0502F SUBSEQUENT PRENATAL CARE: CPT | Performed by: OBSTETRICS & GYNECOLOGY

## 2025-01-22 LAB
ALBUMIN SERPL-MCNC: 2.8 G/DL (ref 3.5–5)
ALBUMIN/GLOB SERPL: 0.7 (ref 1.1–2.2)
ALP SERPL-CCNC: 110 U/L (ref 45–117)
ALT SERPL-CCNC: 20 U/L (ref 12–78)
ANION GAP SERPL CALC-SCNC: 7 MMOL/L (ref 2–12)
AST SERPL-CCNC: 15 U/L (ref 15–37)
BILIRUB SERPL-MCNC: 0.4 MG/DL (ref 0.2–1)
BUN SERPL-MCNC: 9 MG/DL (ref 6–20)
BUN/CREAT SERPL: 13 (ref 12–20)
CALCIUM SERPL-MCNC: 9.4 MG/DL (ref 8.5–10.1)
CHLORIDE SERPL-SCNC: 105 MMOL/L (ref 97–108)
CO2 SERPL-SCNC: 24 MMOL/L (ref 21–32)
CREAT SERPL-MCNC: 0.68 MG/DL (ref 0.55–1.02)
CREAT UR-MCNC: 130 MG/DL
ERYTHROCYTE [DISTWIDTH] IN BLOOD BY AUTOMATED COUNT: 12.5 % (ref 11.5–14.5)
GLOBULIN SER CALC-MCNC: 3.8 G/DL (ref 2–4)
GLUCOSE SERPL-MCNC: 77 MG/DL (ref 65–100)
HCT VFR BLD AUTO: 34.2 % (ref 35–47)
HGB BLD-MCNC: 11 G/DL (ref 11.5–16)
MCH RBC QN AUTO: 29.3 PG (ref 26–34)
MCHC RBC AUTO-ENTMCNC: 32.2 G/DL (ref 30–36.5)
MCV RBC AUTO: 91.2 FL (ref 80–99)
NRBC # BLD: 0 K/UL (ref 0–0.01)
NRBC BLD-RTO: 0 PER 100 WBC
PLATELET # BLD AUTO: 226 K/UL (ref 150–400)
PMV BLD AUTO: 12.6 FL (ref 8.9–12.9)
POTASSIUM SERPL-SCNC: 4.3 MMOL/L (ref 3.5–5.1)
PROT SERPL-MCNC: 6.6 G/DL (ref 6.4–8.2)
PROT UR-MCNC: 14 MG/DL (ref 0–11.9)
PROT/CREAT UR-RTO: 0.1
RBC # BLD AUTO: 3.75 M/UL (ref 3.8–5.2)
SODIUM SERPL-SCNC: 136 MMOL/L (ref 136–145)
URATE SERPL-MCNC: 5.1 MG/DL (ref 2.6–6)
WBC # BLD AUTO: 10.9 K/UL (ref 3.6–11)

## 2025-01-28 ENCOUNTER — HOSPITAL ENCOUNTER (OUTPATIENT)
Facility: HOSPITAL | Age: 35
Discharge: HOME | End: 2025-01-28
Attending: OBSTETRICS & GYNECOLOGY | Admitting: OBSTETRICS & GYNECOLOGY

## 2025-01-28 LAB
ABO + RH BLD: NORMAL
BASOPHILS # BLD: 0.02 K/UL (ref 0–0.1)
BASOPHILS NFR BLD: 0.3 % (ref 0–1)
BLOOD GROUP ANTIBODIES SERPL: NORMAL
DIFFERENTIAL METHOD BLD: ABNORMAL
EOSINOPHIL # BLD: 0.12 K/UL (ref 0–0.4)
EOSINOPHIL NFR BLD: 1.6 % (ref 0–7)
ERYTHROCYTE [DISTWIDTH] IN BLOOD BY AUTOMATED COUNT: 12.2 % (ref 11.5–14.5)
HCT VFR BLD AUTO: 31.6 % (ref 35–47)
HGB BLD-MCNC: 10.8 G/DL (ref 11.5–16)
IMM GRANULOCYTES # BLD AUTO: 0.06 K/UL (ref 0–0.04)
IMM GRANULOCYTES NFR BLD AUTO: 0.8 % (ref 0–0.5)
LYMPHOCYTES # BLD: 1.34 K/UL (ref 0.8–3.5)
LYMPHOCYTES NFR BLD: 17.7 % (ref 12–49)
MCH RBC QN AUTO: 30 PG (ref 26–34)
MCHC RBC AUTO-ENTMCNC: 34.2 G/DL (ref 30–36.5)
MCV RBC AUTO: 87.8 FL (ref 80–99)
MONOCYTES # BLD: 0.65 K/UL (ref 0–1)
MONOCYTES NFR BLD: 8.6 % (ref 5–13)
NEUTS SEG # BLD: 5.39 K/UL (ref 1.8–8)
NEUTS SEG NFR BLD: 71 % (ref 32–75)
NRBC # BLD: 0 K/UL (ref 0–0.01)
NRBC BLD-RTO: 0 PER 100 WBC
PLATELET # BLD AUTO: 209 K/UL (ref 150–400)
PMV BLD AUTO: 12.1 FL (ref 8.9–12.9)
RBC # BLD AUTO: 3.6 M/UL (ref 3.8–5.2)
RPR SER QL: NONREACTIVE
SPECIMEN EXP DATE BLD: NORMAL
WBC # BLD AUTO: 7.6 K/UL (ref 3.6–11)

## 2025-01-28 PROCEDURE — 86592 SYPHILIS TEST NON-TREP QUAL: CPT

## 2025-01-28 PROCEDURE — 36415 COLL VENOUS BLD VENIPUNCTURE: CPT

## 2025-01-28 PROCEDURE — 86901 BLOOD TYPING SEROLOGIC RH(D): CPT

## 2025-01-28 PROCEDURE — 86900 BLOOD TYPING SEROLOGIC ABO: CPT

## 2025-01-28 PROCEDURE — 85025 COMPLETE CBC W/AUTO DIFF WBC: CPT

## 2025-01-28 PROCEDURE — 86850 RBC ANTIBODY SCREEN: CPT

## 2025-01-28 NOTE — PROGRESS NOTES
0900: Patient here for Pre-Admission Testing (PAT) for scheduled  section. PAT packet reviewed with the patient. Labs drawn and sent. ADRIANNA wipes and preventing surgical site infection education given to the patient. Education also provided to be NPO after 0600 and to arrive for scheduled procedure at 1000 on 25. Patient verbalizes understanding and sent home with PAT packet for further review.     Patient instructed to take no medication(s) on the morning of her scheduled procedure.

## 2025-01-29 ENCOUNTER — ANESTHESIA EVENT (OUTPATIENT)
Facility: HOSPITAL | Age: 35
End: 2025-01-29

## 2025-01-29 NOTE — H&P
History & Physical    Name: Eloise Mazariegos MRN: [id number]  SSN: [id number]    YOB: 1990  Age: 34 y.o.  Sex: female        Subjective:     Estimated Date of Delivery: 25  OB History          3    Para   2    Term   2       0    AB        Living   2         SAB        IAB        Ectopic        Molar        Multiple        Live Births                    Ms. Mazariegos is admitted with pregnancy at 38w6d for  Section repeat.  Prenatal course as below. Please see prenatal records for details.    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 Follow up US with MFM normal. 2VC  3rd TM labs: GTT___ Hgb___ Plts___  Vaccines:   Flu:__ TDAP:__ RSV ___ 32-36 weeks September-January   Rhogam: O POSITIVE     GBS:    Pain management in labor :   -Epidural       Postpartum  -Breast/Bottle   -Circ  -Pap      Problem List:  1) Concern for Craniosynostosis on anatomy  -follow up US with MFM normal  2) Csx2  -planning repeat            Past Medical History:   Diagnosis Date    Abnormal Papanicolaou smear of cervix     ASCUS pos hpv. colpo satisfactory    Gestational diabetes     2nd pregnancy    History of miscarriage, currently pregnant      Past Surgical History:   Procedure Laterality Date     SECTION   and     OTHER SURGICAL HISTORY      wisdom teeth     OTHER SURGICAL HISTORY      cyst removed from hand     Social History     Socioeconomic History    Marital status:    Tobacco Use    Smoking status: Never    Smokeless tobacco: Never   Vaping Use    Vaping status: Never Used   Substance and Sexual Activity    Alcohol use:  No    Drug use: No    Sexual activity: Yes     Partners: Male     Birth control/protection: None     Comment:      Social Determinants of Health     Financial Resource Strain: Low Risk  (5/23/2024)    Overall Financial Resource Strain (CARDIA)     Difficulty of Paying Living Expenses: Not hard at all   Food Insecurity: No Food Insecurity (1/28/2025)    Hunger Vital Sign     Worried About Running Out of Food in the Last Year: Never true     Ran Out of Food in the Last Year: Never true   Transportation Needs: No Transportation Needs (1/28/2025)    PRAPARE - Transportation     Lack of Transportation (Medical): No     Lack of Transportation (Non-Medical): No   Housing Stability: Low Risk  (1/28/2025)    Housing Stability Vital Sign     Unable to Pay for Housing in the Last Year: No     Number of Times Moved in the Last Year: 0     Homeless in the Last Year: No     Family History   Problem Relation Age of Onset    Breast Cancer Maternal Grandmother        No Known Allergies  Prior to Admission medications    Medication Sig Start Date End Date Taking? Authorizing Provider   Prenatal MV-Min-Fe Fum-FA-DHA (PRENATAL 1 PO) Take by mouth    Provider, MD Feng        Review of Systems: A comprehensive review of systems was negative.    Objective:     Vitals:  There were no vitals filed for this visit.     Physical Exam:  Patient without distress  Heart: Regular rate and rhythm  Lung: clear to auscultation throughout lung fields, no wheezes, no rales, no rhonchi, and normal respiratory effort  Abdomen: soft, nontender  Membranes:  Intact  Fetal Heart Rate: Reactive  Accelerations: yes    Prenatal Labs:   No components found for: \"OBEXTABORH\", \"OBEXTABSCRN\", \"OBEXTRUBELLA\", \"OBEXTGRBS\", \"OBEXTHBSAG\", \"OBEXTHIV\", \"OBEXTRPR\", \"OBEXTGONORR\", \"OBEXTCHLAM\"     Assessment/Plan:     Plan: Admit for Reassuring fetal status, prep for RLTCS .  Patient was fully  counseled concerning risks of surgery include bleeding,

## 2025-01-30 ENCOUNTER — ANESTHESIA (OUTPATIENT)
Facility: HOSPITAL | Age: 35
End: 2025-01-30

## 2025-01-30 ENCOUNTER — HOSPITAL ENCOUNTER (INPATIENT)
Facility: HOSPITAL | Age: 35
LOS: 2 days | Discharge: HOME | End: 2025-02-01
Attending: OBSTETRICS & GYNECOLOGY | Admitting: OBSTETRICS & GYNECOLOGY

## 2025-01-30 PROCEDURE — 3609079900 HC CESAREAN SECTION: Performed by: OBSTETRICS & GYNECOLOGY

## 2025-01-30 PROCEDURE — 6370000000 HC RX 637 (ALT 250 FOR IP): Performed by: OBSTETRICS & GYNECOLOGY

## 2025-01-30 PROCEDURE — 2580000003 HC RX 258: Performed by: NURSE ANESTHETIST, CERTIFIED REGISTERED

## 2025-01-30 PROCEDURE — 6360000002 HC RX W HCPCS: Performed by: OBSTETRICS & GYNECOLOGY

## 2025-01-30 PROCEDURE — 6360000002 HC RX W HCPCS: Performed by: ANESTHESIOLOGY

## 2025-01-30 PROCEDURE — 3700000000 HC ANESTHESIA ATTENDED CARE: Performed by: OBSTETRICS & GYNECOLOGY

## 2025-01-30 PROCEDURE — 3700000001 HC ADD 15 MINUTES (ANESTHESIA): Performed by: OBSTETRICS & GYNECOLOGY

## 2025-01-30 PROCEDURE — 6360000002 HC RX W HCPCS: Performed by: NURSE ANESTHETIST, CERTIFIED REGISTERED

## 2025-01-30 PROCEDURE — 1120000000 HC RM PRIVATE OB

## 2025-01-30 PROCEDURE — 2580000003 HC RX 258: Performed by: OBSTETRICS & GYNECOLOGY

## 2025-01-30 PROCEDURE — 59510 CESAREAN DELIVERY: CPT | Performed by: OBSTETRICS & GYNECOLOGY

## 2025-01-30 PROCEDURE — 2500000003 HC RX 250 WO HCPCS: Performed by: OBSTETRICS & GYNECOLOGY

## 2025-01-30 PROCEDURE — 7100000000 HC PACU RECOVERY - FIRST 15 MIN: Performed by: OBSTETRICS & GYNECOLOGY

## 2025-01-30 PROCEDURE — 7100000001 HC PACU RECOVERY - ADDTL 15 MIN: Performed by: OBSTETRICS & GYNECOLOGY

## 2025-01-30 PROCEDURE — 2709999900 HC NON-CHARGEABLE SUPPLY: Performed by: OBSTETRICS & GYNECOLOGY

## 2025-01-30 RX ORDER — MODIFIED LANOLIN
OINTMENT (GRAM) TOPICAL
Status: DISCONTINUED | OUTPATIENT
Start: 2025-01-30 | End: 2025-02-01 | Stop reason: HOSPADM

## 2025-01-30 RX ORDER — SODIUM CHLORIDE, SODIUM LACTATE, POTASSIUM CHLORIDE, CALCIUM CHLORIDE 600; 310; 30; 20 MG/100ML; MG/100ML; MG/100ML; MG/100ML
INJECTION, SOLUTION INTRAVENOUS CONTINUOUS
Status: DISCONTINUED | OUTPATIENT
Start: 2025-01-30 | End: 2025-01-30

## 2025-01-30 RX ORDER — IBUPROFEN 400 MG/1
800 TABLET, FILM COATED ORAL EVERY 8 HOURS
Status: DISCONTINUED | OUTPATIENT
Start: 2025-01-31 | End: 2025-02-01 | Stop reason: HOSPADM

## 2025-01-30 RX ORDER — POLYETHYLENE GLYCOL 3350 17 G/17G
17 POWDER, FOR SOLUTION ORAL DAILY PRN
Status: DISCONTINUED | OUTPATIENT
Start: 2025-01-30 | End: 2025-02-01 | Stop reason: HOSPADM

## 2025-01-30 RX ORDER — SODIUM CHLORIDE 0.9 % (FLUSH) 0.9 %
5-40 SYRINGE (ML) INJECTION EVERY 12 HOURS SCHEDULED
Status: DISCONTINUED | OUTPATIENT
Start: 2025-01-30 | End: 2025-02-01 | Stop reason: HOSPADM

## 2025-01-30 RX ORDER — ACETAMINOPHEN 325 MG/1
975 TABLET ORAL ONCE
Status: COMPLETED | OUTPATIENT
Start: 2025-01-30 | End: 2025-01-30

## 2025-01-30 RX ORDER — SODIUM CHLORIDE 9 MG/ML
INJECTION, SOLUTION INTRAVENOUS PRN
Status: DISCONTINUED | OUTPATIENT
Start: 2025-01-30 | End: 2025-02-01 | Stop reason: HOSPADM

## 2025-01-30 RX ORDER — SODIUM CHLORIDE, SODIUM LACTATE, POTASSIUM CHLORIDE, CALCIUM CHLORIDE 600; 310; 30; 20 MG/100ML; MG/100ML; MG/100ML; MG/100ML
INJECTION, SOLUTION INTRAVENOUS CONTINUOUS
Status: DISPENSED | OUTPATIENT
Start: 2025-01-30 | End: 2025-01-31

## 2025-01-30 RX ORDER — OXYCODONE HYDROCHLORIDE 5 MG/1
5 TABLET ORAL EVERY 4 HOURS PRN
Status: DISCONTINUED | OUTPATIENT
Start: 2025-01-31 | End: 2025-02-01 | Stop reason: HOSPADM

## 2025-01-30 RX ORDER — SODIUM CHLORIDE 0.9 % (FLUSH) 0.9 %
10 SYRINGE (ML) INJECTION PRN
Status: DISCONTINUED | OUTPATIENT
Start: 2025-01-30 | End: 2025-01-31 | Stop reason: SDUPTHER

## 2025-01-30 RX ORDER — DOCUSATE SODIUM 100 MG/1
100 CAPSULE, LIQUID FILLED ORAL 2 TIMES DAILY PRN
Status: DISCONTINUED | OUTPATIENT
Start: 2025-01-30 | End: 2025-02-01 | Stop reason: HOSPADM

## 2025-01-30 RX ORDER — ONDANSETRON 2 MG/ML
INJECTION INTRAMUSCULAR; INTRAVENOUS
Status: DISCONTINUED | OUTPATIENT
Start: 2025-01-30 | End: 2025-01-30 | Stop reason: SDUPTHER

## 2025-01-30 RX ORDER — SIMETHICONE 80 MG
80 TABLET,CHEWABLE ORAL EVERY 6 HOURS PRN
Status: DISCONTINUED | OUTPATIENT
Start: 2025-01-30 | End: 2025-02-01 | Stop reason: HOSPADM

## 2025-01-30 RX ORDER — MISOPROSTOL 200 UG/1
800 TABLET ORAL PRN
Status: DISCONTINUED | OUTPATIENT
Start: 2025-01-30 | End: 2025-02-01 | Stop reason: HOSPADM

## 2025-01-30 RX ORDER — SODIUM CHLORIDE 9 MG/ML
INJECTION, SOLUTION INTRAVENOUS PRN
Status: DISCONTINUED | OUTPATIENT
Start: 2025-01-30 | End: 2025-01-31 | Stop reason: SDUPTHER

## 2025-01-30 RX ORDER — ONDANSETRON 2 MG/ML
4 INJECTION INTRAMUSCULAR; INTRAVENOUS EVERY 6 HOURS PRN
Status: DISCONTINUED | OUTPATIENT
Start: 2025-01-30 | End: 2025-02-01 | Stop reason: HOSPADM

## 2025-01-30 RX ORDER — BUPIVACAINE HYDROCHLORIDE 7.5 MG/ML
INJECTION, SOLUTION INTRASPINAL
Status: COMPLETED | OUTPATIENT
Start: 2025-01-30 | End: 2025-01-30

## 2025-01-30 RX ORDER — ONDANSETRON 2 MG/ML
4 INJECTION INTRAMUSCULAR; INTRAVENOUS EVERY 6 HOURS PRN
Status: DISCONTINUED | OUTPATIENT
Start: 2025-01-30 | End: 2025-01-31 | Stop reason: SDUPTHER

## 2025-01-30 RX ORDER — MORPHINE SULFATE 1 MG/ML
INJECTION, SOLUTION EPIDURAL; INTRATHECAL; INTRAVENOUS
Status: COMPLETED | OUTPATIENT
Start: 2025-01-30 | End: 2025-01-30

## 2025-01-30 RX ORDER — OXYCODONE HYDROCHLORIDE 5 MG/1
10 TABLET ORAL EVERY 4 HOURS PRN
Status: DISCONTINUED | OUTPATIENT
Start: 2025-01-31 | End: 2025-02-01 | Stop reason: HOSPADM

## 2025-01-30 RX ORDER — SODIUM CHLORIDE 0.9 % (FLUSH) 0.9 %
10 SYRINGE (ML) INJECTION EVERY 12 HOURS SCHEDULED
Status: DISCONTINUED | OUTPATIENT
Start: 2025-01-30 | End: 2025-01-31 | Stop reason: SDUPTHER

## 2025-01-30 RX ORDER — KETOROLAC TROMETHAMINE 30 MG/ML
30 INJECTION, SOLUTION INTRAMUSCULAR; INTRAVENOUS EVERY 6 HOURS
Status: DISPENSED | OUTPATIENT
Start: 2025-01-30 | End: 2025-01-31

## 2025-01-30 RX ORDER — ACETAMINOPHEN 500 MG
1000 TABLET ORAL EVERY 8 HOURS SCHEDULED
Status: DISCONTINUED | OUTPATIENT
Start: 2025-01-30 | End: 2025-02-01 | Stop reason: HOSPADM

## 2025-01-30 RX ORDER — SODIUM CHLORIDE 0.9 % (FLUSH) 0.9 %
5-40 SYRINGE (ML) INJECTION PRN
Status: DISCONTINUED | OUTPATIENT
Start: 2025-01-30 | End: 2025-02-01 | Stop reason: HOSPADM

## 2025-01-30 RX ORDER — KETOROLAC TROMETHAMINE 30 MG/ML
INJECTION, SOLUTION INTRAMUSCULAR; INTRAVENOUS
Status: DISCONTINUED | OUTPATIENT
Start: 2025-01-30 | End: 2025-01-30 | Stop reason: SDUPTHER

## 2025-01-30 RX ORDER — ONDANSETRON 4 MG/1
4 TABLET, ORALLY DISINTEGRATING ORAL EVERY 8 HOURS PRN
Status: DISCONTINUED | OUTPATIENT
Start: 2025-01-30 | End: 2025-02-01 | Stop reason: HOSPADM

## 2025-01-30 RX ORDER — SODIUM CHLORIDE, SODIUM LACTATE, POTASSIUM CHLORIDE, AND CALCIUM CHLORIDE .6; .31; .03; .02 G/100ML; G/100ML; G/100ML; G/100ML
1000 INJECTION, SOLUTION INTRAVENOUS ONCE
Status: COMPLETED | OUTPATIENT
Start: 2025-01-30 | End: 2025-01-30

## 2025-01-30 RX ADMIN — KETOROLAC TROMETHAMINE 30 MG: 30 INJECTION, SOLUTION INTRAMUSCULAR at 19:51

## 2025-01-30 RX ADMIN — PHENYLEPHRINE HYDROCHLORIDE 40 MCG/MIN: 10 INJECTION INTRAVENOUS at 12:12

## 2025-01-30 RX ADMIN — ACETAMINOPHEN 975 MG: 325 TABLET ORAL at 11:32

## 2025-01-30 RX ADMIN — BUPIVACAINE HYDROCHLORIDE WITH DEXTROSE 11 MG: 7.5 INJECTION SUBARACHNOID at 12:25

## 2025-01-30 RX ADMIN — WATER 2000 MG: 1 INJECTION INTRAMUSCULAR; INTRAVENOUS; SUBCUTANEOUS at 11:31

## 2025-01-30 RX ADMIN — SODIUM CHLORIDE, POTASSIUM CHLORIDE, SODIUM LACTATE AND CALCIUM CHLORIDE: 600; 310; 30; 20 INJECTION, SOLUTION INTRAVENOUS at 12:52

## 2025-01-30 RX ADMIN — MORPHINE SULFATE 0.2 MG: 1 INJECTION EPIDURAL; INTRATHECAL; INTRAVENOUS at 12:25

## 2025-01-30 RX ADMIN — Medication 909 ML/HR: at 12:45

## 2025-01-30 RX ADMIN — SODIUM CHLORIDE, POTASSIUM CHLORIDE, SODIUM LACTATE AND CALCIUM CHLORIDE: 600; 310; 30; 20 INJECTION, SOLUTION INTRAVENOUS at 11:45

## 2025-01-30 RX ADMIN — SODIUM CHLORIDE, POTASSIUM CHLORIDE, SODIUM LACTATE AND CALCIUM CHLORIDE 1000 ML: 600; 310; 30; 20 INJECTION, SOLUTION INTRAVENOUS at 11:15

## 2025-01-30 RX ADMIN — FAMOTIDINE 20 MG: 10 INJECTION, SOLUTION INTRAVENOUS at 11:23

## 2025-01-30 RX ADMIN — ONDANSETRON 4 MG: 2 INJECTION INTRAMUSCULAR; INTRAVENOUS at 12:13

## 2025-01-30 RX ADMIN — KETOROLAC TROMETHAMINE 30 MG: 30 INJECTION, SOLUTION INTRAMUSCULAR at 13:17

## 2025-01-30 ASSESSMENT — PAIN SCALES - GENERAL: PAINLEVEL_OUTOF10: 2

## 2025-01-30 ASSESSMENT — PAIN DESCRIPTION - ORIENTATION: ORIENTATION: LOWER

## 2025-01-30 ASSESSMENT — PAIN DESCRIPTION - LOCATION: LOCATION: INCISION

## 2025-01-30 ASSESSMENT — PAIN DESCRIPTION - DESCRIPTORS: DESCRIPTORS: SORE

## 2025-01-30 NOTE — OP NOTE
Repeat  Operative Note    Name: Eloise Mazariegos   Medical Record Number: [id number]   YOB: 1990  Today's Date: 2025      Pre-operative Diagnosis: Delivery by  section using transverse incision of lower segment of uterus [O82]    Post-operative Diagnosis: * No post-op diagnosis entered *    Operation: Low Cervical Transverse Procedure(s):   SECTION (E R A S)    Surgeon(s):  Lyric Rascon MD Frederick, Louis A Jr., MD    Anesthesia: Spinal    Prophylactic Antibiotics: Ancef  DVT Prophylaxis: Sequential Compression Devices         Fetal Description: bailey     Birth Information:   Information for the patient's :  Romel Mazariegos [[id number]]   @[id number]@    Complications:  none    INDICATION:    34 y.o.  at term for RLTCS    Procedure Detail:      Following verification of  proper patient identification and consent,  the patient was taken back  to the operating room, where spinal anesthesia was administered and found to be adequate.   Rose catheter had been placed using sterile technique.  The patient was prepped and draped in the normal sterile fashion. The abdomen was entered through her prior Pfannenstiel scar with adequate hemostasis performed with cautery. The peritoneum was entered sharply well superior to the bladder without any apparent injury.  The bladder flap was created without difficulty.   A low transverse uterine incision was made with the scalpel and extended with blunt finger dissection.  Amniotomy was performed and the fluid was medium amount clear.  The baby’s head was then delivered without difficulty and atraumatically. The nose and mouth were suctioned. The cord was clamped and cut and the baby was handed off to Nursing staff in attendance.  Placenta was then removed from the uterus. The uterus was curettaged with a moist lap pad and cleared of all clots and debris.  The uterus was noted to involute well with massage and  IV pitocin.  The uterine incision was closed with 0 monocryl, double layer  in running locking fashion with good hemostasis assured.  The anterior pelvis was irrigated with warm normal saline and excellent  hemostasis was confirmed throughout.  The peritoneum  was reapproximated with 2-0 chromic and after re approximation of peritoneum. The fascia was closed with 0 PDS in a running fashion. Good hemostasis was assured. The skin was closed with a 3-0 vicryl subcuticular closure.  Dermabond was applied.  The patient tolerated the procedure well. Sponge, lap, and needle counts were correct times three and the patient and baby were taken to recovery/postpartum room in stable condition.    Lyric Rascon MD  January 30, 2025  1:23 PM

## 2025-01-30 NOTE — ANESTHESIA PRE PROCEDURE
Department of Anesthesiology  Preprocedure Note       Name:  Eloise Mazariegos   Age:  34 y.o.  :  1990                                          MRN:  [id number]         Date:  2025      Surgeon: Surgeon(s):  Lyric Rascon MD    Procedure: Procedure(s):   SECTION (E R A S)    Medications prior to admission:   Prior to Admission medications    Medication Sig Start Date End Date Taking? Authorizing Provider   Prenatal MV-Min-Fe Fum-FA-DHA (PRENATAL 1 PO) Take by mouth    Provider, MD Feng       Current medications:    Current Facility-Administered Medications   Medication Dose Route Frequency Provider Last Rate Last Admin   • lactated ringers infusion   IntraVENous Continuous Lyric Rascon MD       • lactated ringers bolus 1,000 mL  1,000 mL IntraVENous Once Lyric Rascon MD       • sodium chloride flush 0.9 % injection 10 mL  10 mL IntraVENous 2 times per day Lyric Rascon MD       • sodium chloride flush 0.9 % injection 10 mL  10 mL IntraVENous PRN Lyric Rascon MD       • 0.9 % sodium chloride infusion   IntraVENous PRN Lyric Rascon MD       • ceFAZolin (ANCEF) 2,000 mg in sterile water 20 mL IV syringe  2,000 mg IntraVENous Once Lyric Rascon MD       • famotidine (PEPCID) 20 mg in sodium chloride (PF) 0.9 % 10 mL injection  20 mg IntraVENous Once Lyric Rascon MD       • acetaminophen (TYLENOL) tablet 975 mg  975 mg Oral Once Lyric Rascon MD       • oxytocin (PITOCIN) 30 units in 500 mL infusion  87.3 adithya-units/min IntraVENous Continuous PRN Lyric Rascon MD        And   • oxytocin (PITOCIN) 10 unit bolus from the bag  10 Units IntraVENous PRN Lyric Rascon MD       • ondansetron (ZOFRAN) injection 4 mg  4 mg IntraVENous Q6H PRN Lyric Rascon MD           Allergies:  No Known Allergies    Problem List:    Patient Active Problem List   Diagnosis Code   • Severe obesity E66.01   • S/P repeat low transverse  Z98.891   • Pregnancy Z34.90

## 2025-01-30 NOTE — ANESTHESIA PROCEDURE NOTES
Spinal Block    Patient location during procedure: OR  End time: 1/30/2025 12:26 PM  Reason for block: primary anesthetic  Staffing  Performed: anesthesiologist   Anesthesiologist: Gal Simpson Jr., MD  Performed by: Gal Simpson Jr., MD  Authorized by: Gal Simpson Jr., MD    Spinal Block  Patient position: sitting  Prep: DuraPrep  Patient monitoring: cardiac monitor, continuous pulse ox, frequent blood pressure checks and oxygen  Approach: midline  Location: L4/L5  Provider prep: mask and sterile gloves  Needle  Needle type: pencil-tip   Needle gauge: 25 G  Needle length: 4 in  Assessment  Sensory level: T4  Swirl obtained: Yes  CSF: clear  Attempts: 1  Hemodynamics: stable  Preanesthetic Checklist  Completed: patient identified, IV checked, site marked, risks and benefits discussed, surgical/procedural consents, equipment checked, pre-op evaluation, timeout performed, anesthesia consent given, oxygen available, monitors applied/VS acknowledged and fire risk safety assessment completed and verbalized

## 2025-01-30 NOTE — L&D DELIVERY NOTE
Romel Mazariegos [[id number]]      Labor Events     Labor: No  Cervical Ripening Date/Time:      Antibiotics Received during Labor: No  Rupture Date/Time:  25 12:49:00   Rupture Type: AROM  Fluid Color: Clear  Fluid Odor: None  Fluid Volume: Large  Induction: None  Augmentation: None  Labor Complications: None       Anesthesia    Method: Spinal       Labor Length    3rd stage: 0h 03m       Delivery Details      Delivery Date: 25 Delivery Time: 12:45:00   Delivery Type: , Low Transverse  Trial of Labor?: No   Categorization: Repeat   Priority: scheduled              [place] Presentation    Presentation: Vertex       Shoulder Dystocia    Shoulder Dystocia Present?: No       Assisted Delivery Details    Forceps Attempted?: No  Vacuum Extractor Attempted?: No                           Cord    Vessels: 2 Vessels  Complications: None  Delayed Cord Clamping?: Yes  Cord Clamped Date/Time: 2025 12:48:00  Cord Blood Disposition: Lab  Gases Sent?: No              Placenta    Date/Time: 2025 12:48:00  Removal: Spontaneous  Appearance: Intact  Disposition: Discarded       Lacerations    Episiotomy: None       Blood Loss  Mother: Eloise Mazariegos #[id number]     Start of Mother's Information      Delivery Blood Loss   Intrapartum & Postpartum: 25 1205 - 25 1327    Delivery Admission: 25 0950 - 25 1327         Intrapartum & Postpartum Delivery Admission    None                  End of Mother's Information  Mother: Eloise Mazariegos #[id number]                Delivery Providers    Delivering clinician: Lyric Rascon MD     Provider Role    Lyric Rascon MD Obstetrician    Claire, Aniyah Lenard, RN Primary Nurse    Connie Weinberg RN Primary  Nurse     Anesthesiologist    Lashawn Ramos APRN - CRNA Nurse Anesthetist    Jess Kelley Scrub Tech    Janel Dumont RN Surgeon Assistant              [place] Assessment    Living  Status: Living  Delivery Location Comment: OR 1        Skin Color:   Heart Rate:   Reflex Irritability:   Muscle Tone:   Respiratory Effort:   Total:            1 Minute:    1    2    2    2    2    9         5 Minute:    1    2    2    2    2    9                                        Apgars Assigned By: ADEBAYO CASTRO              Resuscitation    Method: Bulb Suction, Stimulation             [place] Measurements               Skin to Skin      Skin to Skin Initiation Date/Time: 25 12:46:00 EST     Skin to Skin With: Mother

## 2025-01-30 NOTE — ANESTHESIA POSTPROCEDURE EVALUATION
Department of Anesthesiology  Postprocedure Note    Patient: Eloise Mazariegos  MRN: [id number]  YOB: 1990  Date of evaluation: 2025    Procedure Summary       Date: 25 Room / Location: [facility] L&D  [facility] L&D OR    Anesthesia Start: 1200 Anesthesia Stop: 1327    Procedure:  SECTION (E R A S) (Abdomen) Diagnosis:       Delivery by  section using transverse incision of lower segment of uterus      (Delivery by  section using transverse incision of lower segment of uterus [O82])    Surgeons: Lyric Rascon MD Responsible Provider: Gal Simpson Jr., MD    Anesthesia Type: Spinal ASA Status: 2            Anesthesia Type: Spinal    Ashley Phase I:      Ashley Phase II:      Anesthesia Post Evaluation    Patient location during evaluation: PACU  Patient participation: complete - patient participated  Level of consciousness: awake  Pain score: 2  Airway patency: patent  Nausea & Vomiting: no nausea  Cardiovascular status: blood pressure returned to baseline and hemodynamically stable  Respiratory status: acceptable  Hydration status: stable  Multimodal analgesia pain management approach    No notable events documented.

## 2025-01-31 LAB
ERYTHROCYTE [DISTWIDTH] IN BLOOD BY AUTOMATED COUNT: 12.5 % (ref 11.5–14.5)
HCT VFR BLD AUTO: 29 % (ref 35–47)
HGB BLD-MCNC: 9.8 G/DL (ref 11.5–16)
MCH RBC QN AUTO: 29.6 PG (ref 26–34)
MCHC RBC AUTO-ENTMCNC: 33.8 G/DL (ref 30–36.5)
MCV RBC AUTO: 87.6 FL (ref 80–99)
NRBC # BLD: 0 K/UL (ref 0–0.01)
NRBC BLD-RTO: 0 PER 100 WBC
PLATELET # BLD AUTO: 189 K/UL (ref 150–400)
PMV BLD AUTO: 12.2 FL (ref 8.9–12.9)
RBC # BLD AUTO: 3.31 M/UL (ref 3.8–5.2)
WBC # BLD AUTO: 12.2 K/UL (ref 3.6–11)

## 2025-01-31 PROCEDURE — 36415 COLL VENOUS BLD VENIPUNCTURE: CPT

## 2025-01-31 PROCEDURE — 85027 COMPLETE CBC AUTOMATED: CPT

## 2025-01-31 PROCEDURE — 6370000000 HC RX 637 (ALT 250 FOR IP): Performed by: OBSTETRICS & GYNECOLOGY

## 2025-01-31 PROCEDURE — 6360000002 HC RX W HCPCS: Performed by: OBSTETRICS & GYNECOLOGY

## 2025-01-31 PROCEDURE — 1120000000 HC RM PRIVATE OB

## 2025-01-31 PROCEDURE — 59510 CESAREAN DELIVERY: CPT | Performed by: OBSTETRICS & GYNECOLOGY

## 2025-01-31 RX ADMIN — DOCUSATE SODIUM 100 MG: 100 CAPSULE, LIQUID FILLED ORAL at 20:30

## 2025-01-31 RX ADMIN — ACETAMINOPHEN 1000 MG: 500 TABLET ORAL at 12:56

## 2025-01-31 RX ADMIN — KETOROLAC TROMETHAMINE 30 MG: 30 INJECTION, SOLUTION INTRAMUSCULAR at 01:36

## 2025-01-31 RX ADMIN — DOCUSATE SODIUM 100 MG: 100 CAPSULE, LIQUID FILLED ORAL at 08:51

## 2025-01-31 RX ADMIN — ACETAMINOPHEN 1000 MG: 500 TABLET ORAL at 20:29

## 2025-01-31 RX ADMIN — IBUPROFEN 800 MG: 400 TABLET, FILM COATED ORAL at 16:58

## 2025-01-31 RX ADMIN — IBUPROFEN 800 MG: 400 TABLET, FILM COATED ORAL at 08:51

## 2025-01-31 ASSESSMENT — PAIN DESCRIPTION - LOCATION
LOCATION: ABDOMEN
LOCATION: ABDOMEN;INCISION

## 2025-01-31 ASSESSMENT — PAIN SCALES - GENERAL
PAINLEVEL_OUTOF10: 2
PAINLEVEL_OUTOF10: 1

## 2025-01-31 ASSESSMENT — PAIN - FUNCTIONAL ASSESSMENT: PAIN_FUNCTIONAL_ASSESSMENT: ACTIVITIES ARE NOT PREVENTED

## 2025-01-31 ASSESSMENT — PAIN DESCRIPTION - DESCRIPTORS
DESCRIPTORS: ACHING
DESCRIPTORS: DULL

## 2025-01-31 ASSESSMENT — PAIN DESCRIPTION - ORIENTATION
ORIENTATION: LOWER
ORIENTATION: LOWER

## 2025-01-31 NOTE — PROGRESS NOTES
Post-Operative  Day 1    Eloise Mazariegos     Assessment: Post-Op day 1, stable                         Post op hgb 9.8; preop 10.8    Plan:   1. Routine post-operative care   2. Declines circumcision for her son    Information for the patient's :  Romel Mazariegos Eloise [[id number]]   , Low Transverse Patient doing well without significant complaint. Nausea and vomiting resolved, tolerating liquids, no flatus, barclay in place.    Vitals:  /67   Pulse 70   Temp 97.5 °F (36.4 °C) (Oral)   Resp 16   Wt 86.6 kg (191 lb)   LMP 2024   SpO2 98%   Breastfeeding Unknown   BMI 38.58 kg/m²   Temp (24hrs), Av.9 °F (36.6 °C), Min:97.5 °F (36.4 °C), Max:98.4 °F (36.9 °C)      Last 24hr Input/Output:    Intake/Output Summary (Last 24 hours) at 2025 0747  Last data filed at 2025 0123  Gross per 24 hour   Intake 1701.42 ml   Output 1503 ml   Net 198.42 ml          Exam:        Patient without distress.      Lungs clear.  Abdomen, bowel sounds present, soft, expected tenderness, fundus firm Wound dressing intact     Perineum normal lochia noted               Lower extremities are negative for swelling, cords or tenderness.    Labs:   Lab Results   Component Value Date/Time    WBC 12.2 2025 04:13 AM    WBC 7.6 2025 09:42 AM    WBC 10.9 2025 04:14 PM    WBC 12.7 2024 11:48 AM    WBC 9.4 2024 03:55 PM    WBC 10.8 2021 08:23 PM    HGB 9.8 2025 04:13 AM    HGB 10.8 2025 09:42 AM    HGB 11.0 2025 04:14 PM    HGB 11.3 2024 11:48 AM    HGB 12.7 2024 03:55 PM    HGB 12.9 2021 08:23 PM    HCT 29.0 2025 04:13 AM    HCT 31.6 2025 09:42 AM    HCT 34.2 2025 04:14 PM    HCT 32.8 2024 11:48 AM    HCT 36.0 2024 03:55 PM    HCT 38.3 2021 08:23 PM     2025 04:13 AM     2025 09:42 AM     2025 04:14 PM     2024 11:48 AM     2024 03:55

## 2025-01-31 NOTE — LACTATION NOTE
This note was copied from a baby's chart.  . Mother states that she nursed her first baby for 13 months and her second baby for 19 months both with a good supply. Mother states that baby has been nursing well today. Baby nursing well today,  deep latch obtained, mother is comfortable, baby feeding vigorously with rhythmic suck, swallow, breathe pattern, audible swallowing, and evident milk transfer, both breasts offered, baby is asleep following feeding. Educated mother about feeding cues, feeding on demand, offering both breasts at every feeding, and feeding at least every 3 hours.    Feeding Plan:  Mother will keep baby skin to skin as often as possible, feed on demand, 8-12x/day , respond to feeding cues, obtain latch, listen for audible swallowing, be aware of signs of oxytocin release/ cramping,thirst,sleepiness while breastfeeding, offer both breasts,and will not limit feedings.  Mother agrees to utilize breast massage while nursing to facilitate lactogenesis.

## 2025-02-01 VITALS
DIASTOLIC BLOOD PRESSURE: 84 MMHG | RESPIRATION RATE: 16 BRPM | BODY MASS INDEX: 38.58 KG/M2 | TEMPERATURE: 98.4 F | HEART RATE: 81 BPM | WEIGHT: 191 LBS | OXYGEN SATURATION: 99 % | SYSTOLIC BLOOD PRESSURE: 121 MMHG

## 2025-02-01 PROCEDURE — APPNB30 APP NON BILLABLE TIME 0-30 MINS

## 2025-02-01 PROCEDURE — 6370000000 HC RX 637 (ALT 250 FOR IP): Performed by: OBSTETRICS & GYNECOLOGY

## 2025-02-01 RX ORDER — POLYETHYLENE GLYCOL 3350 17 G/17G
17 POWDER, FOR SOLUTION ORAL DAILY PRN
Qty: 30 PACKET | Refills: 0 | Status: SHIPPED | OUTPATIENT
Start: 2025-02-01 | End: 2025-03-03

## 2025-02-01 RX ORDER — IBUPROFEN 800 MG/1
800 TABLET, FILM COATED ORAL EVERY 8 HOURS
Qty: 90 TABLET | Refills: 0 | Status: SHIPPED | OUTPATIENT
Start: 2025-02-01

## 2025-02-01 RX ORDER — PSEUDOEPHEDRINE HCL 30 MG
100 TABLET ORAL 2 TIMES DAILY PRN
Qty: 30 CAPSULE | Refills: 0 | Status: SHIPPED | OUTPATIENT
Start: 2025-02-01

## 2025-02-01 RX ADMIN — IBUPROFEN 800 MG: 400 TABLET, FILM COATED ORAL at 00:09

## 2025-02-01 RX ADMIN — ACETAMINOPHEN 1000 MG: 500 TABLET ORAL at 04:33

## 2025-02-01 RX ADMIN — IBUPROFEN 800 MG: 400 TABLET, FILM COATED ORAL at 09:04

## 2025-02-01 RX ADMIN — DOCUSATE SODIUM 100 MG: 100 CAPSULE, LIQUID FILLED ORAL at 09:04

## 2025-02-01 RX ADMIN — ACETAMINOPHEN 1000 MG: 500 TABLET ORAL at 12:16

## 2025-02-01 ASSESSMENT — PAIN SCALES - GENERAL: PAINLEVEL_OUTOF10: 3

## 2025-02-01 ASSESSMENT — PAIN DESCRIPTION - DESCRIPTORS: DESCRIPTORS: SORE

## 2025-02-01 ASSESSMENT — PAIN DESCRIPTION - ORIENTATION: ORIENTATION: LOWER

## 2025-02-01 ASSESSMENT — PAIN - FUNCTIONAL ASSESSMENT: PAIN_FUNCTIONAL_ASSESSMENT: ACTIVITIES ARE NOT PREVENTED

## 2025-02-01 ASSESSMENT — PAIN DESCRIPTION - LOCATION: LOCATION: INCISION

## 2025-02-01 NOTE — DISCHARGE SUMMARY
Obstetrical Discharge Summary     Name: Eloise Mazariegos MRN: [id number]  SSN: [id number]    YOB: 1990  Age: 34 y.o.  Sex: female      Admit Date: 2025    Discharge Date: 2025     Admitting Physician: Lyric Rascon MD     Attending Physician:  Lyric Rascon MD     Admission Diagnoses: Delivery by  section using transverse incision of lower segment of uterus [O82]  S/P repeat low transverse  [Z98.891]    Discharge Diagnoses:   Information for the patient's :  Romel Mazariegos [[id number]]   @[id number]@     Additional Diagnoses:  No components found for: \"OBEXTABORH\", \"OBEXTABSCRN\", \"OBEXTRUBELLA\", \"OBEXTGRBS\"    Hospital Course: Normal hospital course following the delivery.    Disposition at Discharge: Home or self care    Discharged Condition: Stable    Patient Instructions:   Current Discharge Medication List        START taking these medications    Details   ibuprofen (ADVIL;MOTRIN) 800 MG tablet Take 1 tablet by mouth in the morning and 1 tablet at noon and 1 tablet in the evening.  Qty: 90 tablet, Refills: 0      docusate sodium (COLACE, DULCOLAX) 100 MG CAPS Take 100 mg by mouth 2 times daily as needed for Constipation  Qty: 30 capsule, Refills: 0      polyethylene glycol (GLYCOLAX) 17 g packet Take 1 packet by mouth daily as needed for Constipation  Qty: 30 packet, Refills: 0           CONTINUE these medications which have NOT CHANGED    Details   Prenatal MV-Min-Fe Fum-FA-DHA (PRENATAL 1 PO) Take by mouth             Reference my discharge instructions.    No follow-ups on file.     Signed By:  CHERRIE Gomez CNM     2025

## 2025-02-01 NOTE — DISCHARGE INSTRUCTIONS
Postpartum Support Groups   We know that all of us are dealing with a tremendous amount of uncertainty, confusion and disruption to our daily lives, which may result in increased anxiety, depression and fear. If you are feeling unsettled or worse, please know that we are here to help. During this time of increased caution and care for one another, [facility] [place] ([facility]) is offering virtual and in person support groups to ALL MOTHERS in [place] regardless of the age of your child/children as a way to help weather this emotional storm together. Social support is an important part of self-care during this time of physical distancing.  Virtual postpartum support group meetings available at www.[facility].org  Warm Line: 930.103.2716    Breastfeeding Support Groups   1st and 3rd Wednesday of each month at [facility]  2nd and 4th Tuesday of each month at [facility] (in education center behind cafeteria)    [email or web address]       Learning About Preeclampsia After Childbirth  What is preeclampsia?     Preeclampsia is high blood pressure and signs of organ damage, such as protein in the urine, usually after 20 weeks of pregnancy. If it's not treated, preeclampsia can harm you or your baby.  Severe preeclampsia can lead to dangerous seizures (eclampsia). When preeclampsia affects the liver, it can cause HELLP syndrome, a blood-clotting and bleeding problem. HELLP can come on quickly and can be dangerous. This is why your doctor checks you and your baby often.  Preeclampsia usually goes away after the baby is born. But symptoms may last or get worse after delivery. In rare cases, symptoms may not show up until days or even weeks after childbirth.  What are the symptoms?  Mild preeclampsia usually doesn't cause symptoms. But it may cause rapid weight gain and sudden swelling of the hands and face. Severe preeclampsia can cause symptoms such as a severe

## 2025-02-01 NOTE — LACTATION NOTE
This note was copied from a baby's chart.  Baby nursing well and has improved throughout post partum stay, deep latch maintained, mother is comfortable, milk is in transition, baby feeding vigorously with rhythmic suck, swallow, breathe pattern, with audible swallowing, and evident milk transfer, both breasts offered, baby is asleep following feeding. Baby is feeding on demand, voiding and stools present as appropriate since birth. Weight loss:  6.1%    Breasts may become engorged when milk \"comes in\".  How milk is made / normal phases of milk production, supply and demand discussed.  Taught care of engorged breasts - frequent breastfeeding encouraged and breast massage ac. Then nurse the baby (or pump minimally for comfort). Apply cold compresses ac and/or pc x 15 minutes a few times a day for swelling or discomfort if necessary.  May need to do this care for a couple of days.

## 2025-02-01 NOTE — PROGRESS NOTES
Post-Operative  Day 2     Delilah     Assessment: Post-Op day 2, doing well  Desires early discharge home yes no: Yes    Plan:   1. Routine post-operative care    Information for the patient's :  Romel Mazariegos  [[id number]]   , Low Transverse     Patient doing well without significant complaint  Ambulating and voiding without difficulty   Nausea and vomiting resolved  Tolerating PO and PO meds well  Passing flatus  BM yes no: Yes  Breastfeeding well established      Vitals:  /84   Pulse 81   Temp 98.4 °F (36.9 °C) (Oral)   Resp 16   Wt 86.6 kg (191 lb)   LMP 2024   SpO2 99%   Breastfeeding Unknown   BMI 38.58 kg/m²   Temp (24hrs), Av.5 °F (36.9 °C), Min:98.2 °F (36.8 °C), Max:98.8 °F (37.1 °C)        Exam:    A,A&Ox3      Patient without distress  Breasts soft, NT  Abdomen soft expected tenderness  FF scant Lochia Rubra  Wound incision clean, dry and intact  Lower extremities are negative for swelling, cords or tenderness.    Labs:   Lab Results   Component Value Date/Time    WBC 12.2 2025 04:13 AM    WBC 7.6 2025 09:42 AM    WBC 10.9 2025 04:14 PM    WBC 12.7 2024 11:48 AM    WBC 9.4 2024 03:55 PM    WBC 10.8 2021 08:23 PM    HGB 9.8 2025 04:13 AM    HGB 10.8 2025 09:42 AM    HGB 11.0 2025 04:14 PM    HGB 11.3 2024 11:48 AM    HGB 12.7 2024 03:55 PM    HGB 12.9 2021 08:23 PM    HCT 29.0 2025 04:13 AM    HCT 31.6 2025 09:42 AM    HCT 34.2 2025 04:14 PM    HCT 32.8 2024 11:48 AM    HCT 36.0 2024 03:55 PM    HCT 38.3 2021 08:23 PM     2025 04:13 AM     2025 09:42 AM     2025 04:14 PM     2024 11:48 AM     2024 03:55 PM     2021 08:23 PM       No results found for this or any previous visit (from the past 24 hour(s)).      A/P:  Routine Post-Op Care  Ambulate  Lactation consult  prn  Discharge home in AM     CHERRIE Gomez CNM

## 2025-03-13 ENCOUNTER — ROUTINE PRENATAL (OUTPATIENT)
Age: 35
End: 2025-03-13

## 2025-03-13 VITALS
HEART RATE: 84 BPM | WEIGHT: 180 LBS | DIASTOLIC BLOOD PRESSURE: 86 MMHG | SYSTOLIC BLOOD PRESSURE: 130 MMHG | BODY MASS INDEX: 36.36 KG/M2

## 2025-03-13 DIAGNOSIS — Z34.90 PREGNANCY, UNSPECIFIED GESTATIONAL AGE: Primary | ICD-10-CM

## 2025-03-13 DIAGNOSIS — Z01.419 ENCOUNTER FOR GYNECOLOGICAL EXAMINATION: ICD-10-CM

## 2025-03-13 DIAGNOSIS — Z12.4 CERVICAL CANCER SCREENING: ICD-10-CM

## 2025-03-13 DIAGNOSIS — R87.612 LGSIL ON PAP SMEAR OF CERVIX: ICD-10-CM

## 2025-03-13 PROCEDURE — 0503F POSTPARTUM CARE VISIT: CPT

## 2025-03-13 NOTE — PROGRESS NOTES
Eloise Mazariegos is a 34 y.o. female returns for a routine post-partum follow-up visit. Doing well, needs pap smear today due to pap in [facility] pap in     Chief Complaint   Patient presents with    Postpartum Care       Postpartum Depression: Low Risk  (2025)    [place]  Depression Scale     Last EPDS Total Score: 0     Last EPDS Self Harm Result: Never         Type of delivery: repeat  section  Date of Delivery: 2025  Breastfeeding: yes  Bleeding Resolved: yes  Birth Control: none.  Last Pap: abnormal obtained 1 year(s) ago.        Problems: no problems    1. Have you been to the ER, urgent care clinic, or hospitalized since your last visit? No    2. Have you seen or consulted any other health care providers outside of the [facility] since your last visit? No    Sadia Love LPN.

## 2025-03-14 NOTE — PROGRESS NOTES
Postpartum note    Patient here for postpartum visit.  PHYSICAL EXAMINATION    Constitutional  Appearance: well-nourished, well developed, alert, in no acute distress    HENT  Head and Face: appears normal    Gastrointestinal  Abdominal Examination: abdomen non-tender to palpation, normal bowel sounds, no masses present  Liver and spleen: no hepatomegaly present, spleen not palpable  Hernias: no hernias identified    Genitourinary  External Genitalia: normal appearance for age, no discharge present, no tenderness present, no inflammatory lesions present, no masses present, no atrophy present  Vagina: normal vaginal vault without central or paravaginal defects, no discharge present, no inflammatory lesions present, no masses present  Bladder: non-tender to palpation  Urethra: appears normal  Cervix: normal   Uterus: normal size, shape and consistency  Adnexa: no adnexal tenderness present, no adnexal masses present  Perineum: perineum within normal limits, no evidence of trauma, no rashes or skin lesions present  Anus: anus within normal limits, no hemorrhoids present  Inguinal Lymph Nodes: no lymphadenopathy present    Skin  General Inspection: no rash, no lesions identified    Neurologic/Psychiatric  Mental Status:  Orientation: grossly oriented to person, place and time  Mood and Affect: mood normal, affect appropriate    Assessment:  Normal postpartum check    Plan:  Pap today.   Patient declines presence of chaperone during today's visit.   Feeling well overall, good support.  Pumping.   130/86--pt will continue to take at home, follow up with PCP.  RTO for AE.  Rx for contraception: none    CHERRIE Gomez CNM

## 2025-03-17 ENCOUNTER — RESULTS FOLLOW-UP (OUTPATIENT)
Age: 35
End: 2025-03-17

## 2025-03-17 LAB
CYTOLOGIST CVX/VAG CYTO: ABNORMAL
CYTOLOGY CVX/VAG DOC CYTO: ABNORMAL
CYTOLOGY CVX/VAG DOC THIN PREP: ABNORMAL
DX ICD CODE: ABNORMAL
DX ICD CODE: ABNORMAL
HPV GENOTYPE REFLEX: ABNORMAL
HPV I/H RISK 4 DNA CVX QL PROBE+SIG AMP: NEGATIVE
OTHER STN SPEC: ABNORMAL
PATHOLOGIST CVX/VAG CYTO: ABNORMAL
RECOM F/U CVX/VAG CYTO: ABNORMAL
SERVICE CMNT-IMP: ABNORMAL
STAT OF ADQ CVX/VAG CYTO-IMP: ABNORMAL

## 2025-03-18 NOTE — TELEPHONE ENCOUNTER
Name and  verified. Per Norma Porrsa MD, Called and spoke with patient regarding recent results. Patient verbalized understanding and has no questions at this time. Advised patient will send message with details as well. RN scheduled colposcopy.

## 2025-03-31 NOTE — PROGRESS NOTES
Eloise Mazariegos is a 34 y.o. female presents for a Colposcopy.    Chief Complaint   Patient presents with    Procedure     No LMP recorded (lmp unknown).    New to Dr. Porras. Former Maximo, NERISSA and Tarah pt    Last Pap: 03/13/25 ASCUS/HPV neg, 05/23/24 LSIL    S/p C/S 1/30/25    1. Have you been to the ER, urgent care clinic, or hospitalized since your last visit? No    2. Have you seen or consulted any other health care providers outside of the [facility] since your last visit? No    Chart reviewed for the following:   Gracie VALENZUELA LPN, have reviewed the History, Physical and updated the Allergic reactions for Eloise Mazariegos     TIME OUT performed immediately prior to start of procedure:   Gracie VALENZUELA LPN, have performed the following reviews on Eloise Mazariegos prior to the start of the procedure:            * Patient was identified by name and date of birth   * Agreement on procedure being performed was verified  * Risks and Benefits explained to the patient  * Procedure site verified and marked as necessary  * Patient was positioned for comfort  * Consent was signed and verified     Time: 1357    Date of procedure: 4/2/2025    Procedure performed by:  Norma Porras MD       Provider assisted by: SILVIANO Lam LPN     Patient assisted by: self    How tolerated by patient: tolerated the procedure well with no complications    Post Procedural Pain Scale: 2 - Hurts Little Bit    Comments: none  Gracie Lam LPN  4/2/2025  1:58 PM

## 2025-04-01 NOTE — PROGRESS NOTES
Abnormal Pap Problem Visit    Chief Complaint   Patient presents with    Procedure       Eloise gallegos a 34 y.o.  presenting for problem visit for discussion and management of an abnormal pap test.      2018: ASCUS/HPV neg  2024: LSIL/HPV neg  3/2025: ASCUS/HPV neg    She is sexually active. She is currently pumping. She has not had resumption of menses.     OB History    Para Term  AB Living   5 3 3 0 2 3   SAB IAB Ectopic Molar Multiple Live Births   2     3      # Outcome Date GA Lbr Zeyad/2nd Weight Sex Type Anes PTL Lv   5 Term 25 39w0d  3.27 kg (7 lb 3.3 oz) M CS-LTranv Spinal N HERIBERTO   4 Term 18 39w2d  3.505 kg (7 lb 11.6 oz) M CS-LTranv  N HERIBERTO      Birth Comments: NMS: NORMAL- Reported on 19   3 Term 15 40w5d  3.31 kg (7 lb 4.8 oz) F CS-LTranv  N HERIBERTO      Complications: Failure to Progress in First Stage   2 SAB            1 SAB                  Past Medical History:   Diagnosis Date    Abnormal Papanicolaou smear of cervix     ASCUS pos hpv. colpo satisfactory    Gestational diabetes     2nd pregnancy    History of miscarriage, currently pregnant        Past Surgical History:   Procedure Laterality Date     SECTION   and      SECTION N/A 2025     SECTION (E R A S) performed by Lyric Rascon MD at [facility] L[facility]D OR    OTHER SURGICAL HISTORY      wisdom teeth     OTHER SURGICAL HISTORY      cyst removed from hand       Family History   Problem Relation Age of Onset    Breast Cancer Maternal Grandmother        Social History     Socioeconomic History    Marital status:      Spouse name: Not on file    Number of children: Not on file    Years of education: Not on file    Highest education level: Not on file   Occupational History    Not on file   Tobacco Use    Smoking status: Never    Smokeless tobacco: Never   Vaping Use    Vaping status: Never Used   Substance and Sexual Activity    Alcohol use: No    Drug use: No    Sexual

## 2025-04-02 ENCOUNTER — HOSPITAL ENCOUNTER (OUTPATIENT)
Facility: HOSPITAL | Age: 35
Setting detail: SPECIMEN
Discharge: HOME | End: 2025-04-05

## 2025-04-02 ENCOUNTER — PROCEDURE VISIT (OUTPATIENT)
Age: 35
End: 2025-04-02

## 2025-04-02 VITALS
OXYGEN SATURATION: 98 % | RESPIRATION RATE: 18 BRPM | WEIGHT: 180 LBS | HEIGHT: 59 IN | BODY MASS INDEX: 36.29 KG/M2 | HEART RATE: 82 BPM | SYSTOLIC BLOOD PRESSURE: 128 MMHG | DIASTOLIC BLOOD PRESSURE: 82 MMHG | TEMPERATURE: 98.1 F

## 2025-04-02 DIAGNOSIS — R87.610 ASCUS OF CERVIX WITH NEGATIVE HIGH RISK HPV: Primary | ICD-10-CM

## 2025-04-02 LAB
HCG, PREGNANCY, URINE, POC: NEGATIVE
VALID INTERNAL CONTROL, POC: YES

## 2025-04-11 ENCOUNTER — RESULTS FOLLOW-UP (OUTPATIENT)
Age: 35
End: 2025-04-11

## (undated) DEVICE — POOLE SUCTION INSTRUMENT WITH REMOVABLE SHEATH: Brand: POOLE

## (undated) DEVICE — ADHESIVE SKIN CLOSURE TOP 36 CC HI VISC DERMBND MINI

## (undated) DEVICE — STERILE POLYISOPRENE POWDER-FREE SURGICAL GLOVES: Brand: PROTEXIS

## (undated) DEVICE — COVERALL PREM SMS 2XL KNIT --

## (undated) DEVICE — CATH FOLEY 16F LUBRI-SIL IC --

## (undated) DEVICE — ELECTRODE PT RET AD L9FT HI MOIST COND ADH HYDRGEL CORDED

## (undated) DEVICE — SUT CHRMC 2-0 27IN CT1 BRN --

## (undated) DEVICE — DRAPE FLD WRM W44XL66IN C6L FOR INTRATEMP SYS THERMABASIN

## (undated) DEVICE — SUTURE MCRYL SZ 4-0 L27IN ABSRB UD L24MM PS-1 3/8 CIR PRIM Y935H

## (undated) DEVICE — SOLUTION IRRIG 1000ML STRL H2O USP PLAS POUR BTL

## (undated) DEVICE — SUTURE PDS II SZ 0 L36IN ABSRB VLT L40MM CT 1/2 CIR Z358T

## (undated) DEVICE — KENDALL SCD EXPRESS SLEEVES, KNEE LENGTH, MEDIUM: Brand: KENDALL SCD

## (undated) DEVICE — DEVON™ KNEE AND BODY STRAP 60" X 3" (1.5 M X 7.6 CM): Brand: DEVON

## (undated) DEVICE — ROYAL SILK SURGICAL GOWN, XXL: Brand: CONVERTORS

## (undated) DEVICE — ROYALSILK SURGICAL GOWN, L: Brand: CONVERTORS

## (undated) DEVICE — SPINAL TRAY EPIDURAL KT FLEXTIP +

## (undated) DEVICE — CATHETER URIN 16FR 30CC BLLN 2 W F LUBRI-SIL IC

## (undated) DEVICE — SPROTTE TRAY 24G X 4'' (103MM) NEEDLE WITH 40MM INTRODUCER (SPINAL)

## (undated) DEVICE — Device: Brand: PORTEX

## (undated) DEVICE — MEDI-VAC NON-CONDUCTIVE SUCTION TUBING: Brand: CARDINAL HEALTH

## (undated) DEVICE — (D)PREP SKN CHLRAPRP APPL 26ML -- CONVERT TO ITEM 371833

## (undated) DEVICE — SOLIDIFIER MEDC 1200ML -- CONVERT TO 356117

## (undated) DEVICE — APPLICATOR MEDICATED 26 CC SOLUTION HI LT ORNG CHLORAPREP

## (undated) DEVICE — SOLUTION IV 1000ML 0.9% SOD CHL

## (undated) DEVICE — COVERALLS PROTCT 2XL WHT SMS ANTISTATIC PREM KNIT CUF FULL

## (undated) DEVICE — SOLIDIFIER FLUID 1.3 OZ GEL 1200CC NS

## (undated) DEVICE — PENCIL SMK EVAC 10 FT BLADE ELECTRD ROCKER FOR TELSCP

## (undated) DEVICE — 3000CC GUARDIAN II: Brand: GUARDIAN

## (undated) DEVICE — SUTURE MONOCRYL SZ0 L36IN VIO ABSORB CT-1 NDL HALF CIR MONOCRYL PLUS

## (undated) DEVICE — SOLUTION IRRIG 1000ML 0.9% SOD CHL USP POUR PLAS BTL

## (undated) DEVICE — 3M™ TEGADERM™ TRANSPARENT FILM DRESSING FRAME STYLE, 1628, 6 IN X 8 IN (15 CM X 20 CM), 10/CT 8CT/CASE: Brand: 3M™ TEGADERM™

## (undated) DEVICE — LIGHT HANDLE: Brand: DEVON

## (undated) DEVICE — DRAPE SURG OBSTETRICS/GYNECOLOGY C-SECTION FAMILY VIEWING

## (undated) DEVICE — SUTURE MCRYL + SZ 4-0 L27IN ABSRB UD PS1 L24MM 3/8 CIR REV MCP935H

## (undated) DEVICE — SUTURE MCRYL SZ 0 L36IN ABSRB UD L36MM CT-1 1/2 CIR Y946H

## (undated) DEVICE — REM POLYHESIVE ADULT PATIENT RETURN ELECTRODE: Brand: VALLEYLAB

## (undated) DEVICE — PACK PROCEDURE SURG C SECT KT SMH

## (undated) DEVICE — SUTURE CHROMIC GUT SZ 2-0 L27IN ABSRB BRN L36MM CT-1 1/2 811H

## (undated) DEVICE — C-SECTION - SMH: Brand: MEDLINE INDUSTRIES, INC.

## (undated) DEVICE — KIT BLD SAMP ART W/ 1ML LUERSLIP SYR 25GA PRE ATTCAHED NDL

## (undated) DEVICE — KIWI® VACUUM DELIVERY SYSTEM PROCUP®: Brand: KIWI® PROCUP®

## (undated) DEVICE — ATTACHMENT SMK 3/8INX10FT VALLEYLAB

## (undated) DEVICE — SOLUTION IRRIG 1000ML H2O STRL BLT